# Patient Record
Sex: MALE
[De-identification: names, ages, dates, MRNs, and addresses within clinical notes are randomized per-mention and may not be internally consistent; named-entity substitution may affect disease eponyms.]

---

## 2022-06-03 ENCOUNTER — RX ONLY (RX ONLY)
Age: 42
End: 2022-06-03

## 2022-06-03 ENCOUNTER — SPOT (OUTPATIENT)
Dept: URBAN - METROPOLITAN AREA CLINIC 38 | Facility: CLINIC | Age: 42
Setting detail: DERMATOLOGY
End: 2022-06-03

## 2022-06-03 DIAGNOSIS — L57.0 ACTINIC KERATOSIS: ICD-10-CM

## 2022-06-03 PROBLEM — D23.5 OTHER BENIGN NEOPLASM OF SKIN OF TRUNK: Status: RESOLVED | Noted: 2022-06-03

## 2022-06-03 PROBLEM — L82.0 INFLAMED SEBORRHEIC KERATOSIS: Status: RESOLVED | Noted: 2022-06-03

## 2022-06-03 PROBLEM — L21.8 OTHER SEBORRHEIC DERMATITIS: Status: RESOLVED | Noted: 2022-06-03

## 2022-06-03 PROBLEM — L82.1 OTHER SEBORRHEIC KERATOSIS: Status: RESOLVED | Noted: 2022-06-03

## 2022-06-03 PROCEDURE — 99204 OFFICE O/P NEW MOD 45 MIN: CPT

## 2022-06-03 RX ORDER — KETOCONAZOLE 20 MG/G
A SMALL AMOUNT CREAM TOPICAL TWICE A DAY
Qty: 30 | Refills: 1
Start: 2022-06-03

## 2022-06-03 RX ORDER — HYDROCORTISONE 25 MG/G
A SMALL AMOUNT CREAM TOPICAL TWICE A DAY
Qty: 28 | Refills: 1
Start: 2022-06-03

## 2023-11-08 PROBLEM — I83.90 VARICOSE VEIN OF LEG: Status: ACTIVE | Noted: 2023-11-08

## 2023-11-08 PROBLEM — M91.10: Status: ACTIVE | Noted: 2023-11-08

## 2023-11-08 RX ORDER — MELOXICAM 15 MG/1
1 TABLET ORAL DAILY PRN
COMMUNITY
Start: 2022-02-23 | End: 2023-12-22 | Stop reason: ALTCHOICE

## 2023-11-10 ENCOUNTER — OFFICE VISIT (OUTPATIENT)
Dept: ORTHOPEDIC SURGERY | Facility: HOSPITAL | Age: 43
End: 2023-11-10
Payer: COMMERCIAL

## 2023-11-10 ENCOUNTER — HOSPITAL ENCOUNTER (OUTPATIENT)
Dept: RADIOLOGY | Facility: HOSPITAL | Age: 43
Discharge: HOME | End: 2023-11-10
Payer: COMMERCIAL

## 2023-11-10 VITALS — BODY MASS INDEX: 28.88 KG/M2 | WEIGHT: 195 LBS | HEIGHT: 69 IN

## 2023-11-10 DIAGNOSIS — M25.552 PAIN OF BOTH HIP JOINTS: ICD-10-CM

## 2023-11-10 DIAGNOSIS — M21.70 LEG LENGTH DISCREPANCY: ICD-10-CM

## 2023-11-10 DIAGNOSIS — M25.552 PAIN OF BOTH HIP JOINTS: Primary | ICD-10-CM

## 2023-11-10 DIAGNOSIS — M25.551 PAIN OF BOTH HIP JOINTS: Primary | ICD-10-CM

## 2023-11-10 DIAGNOSIS — M91.12: ICD-10-CM

## 2023-11-10 DIAGNOSIS — M25.551 PAIN OF BOTH HIP JOINTS: ICD-10-CM

## 2023-11-10 DIAGNOSIS — M16.12 ARTHRITIS OF LEFT HIP: ICD-10-CM

## 2023-11-10 PROCEDURE — 73502 X-RAY EXAM HIP UNI 2-3 VIEWS: CPT | Mod: LT

## 2023-11-10 PROCEDURE — 99215 OFFICE O/P EST HI 40 MIN: CPT | Performed by: ORTHOPAEDIC SURGERY

## 2023-11-10 PROCEDURE — 1036F TOBACCO NON-USER: CPT | Performed by: ORTHOPAEDIC SURGERY

## 2023-11-10 PROCEDURE — 73502 X-RAY EXAM HIP UNI 2-3 VIEWS: CPT | Mod: LEFT SIDE | Performed by: RADIOLOGY

## 2023-11-10 PROCEDURE — 73502 X-RAY EXAM HIP UNI 2-3 VIEWS: CPT | Mod: LT,FY

## 2023-11-10 ASSESSMENT — PAIN - FUNCTIONAL ASSESSMENT: PAIN_FUNCTIONAL_ASSESSMENT: NO/DENIES PAIN

## 2023-11-10 NOTE — PROGRESS NOTES
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  I saw him about 18 months ago and he was working toward a time when he would consider arthroplasty.  He is ready now having progressive pain having challenges performing his activities of daily living.  He is an avid hiker and outdoorsman and can all and participate in these activities.  He notes mostly groin pain and it is affecting his ability to work.    The patient does not endorse fevers and chills. ~He/She~ does not endorse any change in her vision or hearing. ~He/She~ does not endorse chest pain, shortness of breath. ~He/She~ does not endorse any abdominal discomfort. ~He/She~ does not endorse any skin irritation or lesions. ~He/She~ does not endorse any new numbness and tingling or as otherwise stated in the history of present illness.    ~He/She~ is in no acute distress, alert and oriented x 3.    Mood and affect are appropriate.    Respirations are unlabored.    Distal limb is pink and well perfused.    Left lower extremity evaluation demonstrates intact skin.  Range of motion is limited in deep flexion and internal rotation has pain with both of these.  Sensation is intact to light touch in the tibial, sural, saphenous, superficial peroneal, and deep peroneal nerve distributions. Foot is warm and well-perfused.    Multiple views of his left hip with straight flattening of the femoral head with coxa breva deformity and elongated femoral head as well as a reciprocal acetabular deformity.  The    43-year-old male with significant Perthes disease sequela and arthritis.  Surgical nonsurgical options.  We will plan for surgery 1/4/2024.  I talked with the patient at length about risks, limitations, benefits and alternatives to total hip replacement today. Under my care or the care of previous providers, the patient has had a reasonable trial of nonoperative treatment for their hip problem including NSAIDS, tylenol  or other analgesics, activity  modification and activity restriction and use of assistive devices.  These  previous treatments have not provided the patient with durable relief of their symptoms.The patient is not an appropriate candidate for physical therapy at this time. Despite the above, patient has had pain and symptomatic functional impairment that either interferes with their sleep or ADLs and quality of life. I reviewed concerns about implant wear, loosening, breakage, infection and infection prophylaxis, DVT, PE, death and other medical and anesthetic complications of surgery. We talked about the potential for persistent pain following surgery since there are many possible causes for hip and leg pain. The patient was advised that hip replacement will only relieve pain that is coming from the hip. We talked about leg length discrepancy, neurovascular problems and dislocation after surgery. The patient understands that we may have to lengthen the leg slightly to provide for adequate stability of the hip. I reviewed dislocation precautions and activity restrictions in detail. We discussed advantages and disadvantages of cemented and cementless implant fixation. We discussed the concerns about intraoperative fracture, ingrowth failure, thigh pain and possible post-operative weight bearing restrictions following cementless hip replacement. We discussed advantages and disadvantages of different surgical approaches. We discussed the possible need for a homologous blood transfusion. We discussed the fact that many of our patients are able to go home in 1-2 days depending on their health, mobility, pre-op preparation, individual home situation and personal preference. The patient should take our pre-operative teaching class. All of the patients questions were answered. The patient can call my office to schedule surgery and the pre-op teaching class. I told the patient that our pre-operative admission clinic will contact them to discuss fitness  for surgery. The patient has identified their personal goals of their joint replacement surgery and recovery and we have discussed them. These are documented in our [employer] patient engagement platform. In addition, we have discussed the advantages and disadvantages of various implant and fixation options, as well as various surgical approaches. The basic concepts of the joint replacement procedure has been reviewed with the patient and the patient will be provided the opportunity to see an actual implant in our pre-op education class.  Nonoperative and operative treatment options were presented to the patient. After discussion, operative treatment was elected. Risks and benefits of surgery were discussed with the patient which include, but are not limited to, death, infection, bleeding, neurologic damage, nonunion, malunion, posttraumatic arthritis, incomplete resolution of symptoms, failure of the operation, and others. The patient understood and elected to proceed.    Natural History reviewed. All questions answered. ~He/She~ was in agreement with the plan.      **This note was created using voice recognition software and was not corrected for typographical or grammatical errors.**

## 2023-11-13 DIAGNOSIS — M16.12 PRIMARY OSTEOARTHRITIS OF LEFT HIP: ICD-10-CM

## 2023-12-07 ENCOUNTER — TELEMEDICINE CLINICAL SUPPORT (OUTPATIENT)
Dept: PREADMISSION TESTING | Facility: HOSPITAL | Age: 43
End: 2023-12-07
Payer: COMMERCIAL

## 2023-12-22 ENCOUNTER — HOSPITAL ENCOUNTER (OUTPATIENT)
Dept: RADIOLOGY | Facility: HOSPITAL | Age: 43
Discharge: HOME | End: 2023-12-22
Payer: COMMERCIAL

## 2023-12-22 ENCOUNTER — PRE-ADMISSION TESTING (OUTPATIENT)
Dept: PREADMISSION TESTING | Facility: HOSPITAL | Age: 43
End: 2023-12-22
Payer: COMMERCIAL

## 2023-12-22 VITALS
TEMPERATURE: 98.2 F | OXYGEN SATURATION: 98 % | SYSTOLIC BLOOD PRESSURE: 136 MMHG | BODY MASS INDEX: 30.79 KG/M2 | HEIGHT: 69 IN | HEART RATE: 78 BPM | WEIGHT: 207.89 LBS | DIASTOLIC BLOOD PRESSURE: 99 MMHG

## 2023-12-22 DIAGNOSIS — Z01.818 PREOPERATIVE TESTING: Primary | ICD-10-CM

## 2023-12-22 LAB
ANION GAP SERPL CALC-SCNC: 17 MMOL/L (ref 10–20)
BUN SERPL-MCNC: 16 MG/DL (ref 6–23)
CALCIUM SERPL-MCNC: 9.7 MG/DL (ref 8.6–10.6)
CHLORIDE SERPL-SCNC: 105 MMOL/L (ref 98–107)
CO2 SERPL-SCNC: 22 MMOL/L (ref 21–32)
CREAT SERPL-MCNC: 0.94 MG/DL (ref 0.5–1.3)
ERYTHROCYTE [DISTWIDTH] IN BLOOD BY AUTOMATED COUNT: 11.9 % (ref 11.5–14.5)
GFR SERPL CREATININE-BSD FRML MDRD: >90 ML/MIN/1.73M*2
GLUCOSE SERPL-MCNC: 74 MG/DL (ref 74–99)
HCT VFR BLD AUTO: 46.3 % (ref 41–52)
HGB BLD-MCNC: 15.7 G/DL (ref 13.5–17.5)
MCH RBC QN AUTO: 31.2 PG (ref 26–34)
MCHC RBC AUTO-ENTMCNC: 33.9 G/DL (ref 32–36)
MCV RBC AUTO: 92 FL (ref 80–100)
NRBC BLD-RTO: 0 /100 WBCS (ref 0–0)
PLATELET # BLD AUTO: 224 X10*3/UL (ref 150–450)
POTASSIUM SERPL-SCNC: 4.3 MMOL/L (ref 3.5–5.3)
RBC # BLD AUTO: 5.03 X10*6/UL (ref 4.5–5.9)
SODIUM SERPL-SCNC: 140 MMOL/L (ref 136–145)
WBC # BLD AUTO: 6.8 X10*3/UL (ref 4.4–11.3)

## 2023-12-22 PROCEDURE — 72170 X-RAY EXAM OF PELVIS: CPT | Performed by: RADIOLOGY

## 2023-12-22 PROCEDURE — 85027 COMPLETE CBC AUTOMATED: CPT

## 2023-12-22 PROCEDURE — 72170 X-RAY EXAM OF PELVIS: CPT

## 2023-12-22 PROCEDURE — 80048 BASIC METABOLIC PNL TOTAL CA: CPT

## 2023-12-22 PROCEDURE — 36415 COLL VENOUS BLD VENIPUNCTURE: CPT

## 2023-12-22 PROCEDURE — 99203 OFFICE O/P NEW LOW 30 MIN: CPT | Performed by: NURSE PRACTITIONER

## 2023-12-22 PROCEDURE — 87081 CULTURE SCREEN ONLY: CPT

## 2023-12-22 RX ORDER — CHLORHEXIDINE GLUCONATE ORAL RINSE 1.2 MG/ML
SOLUTION DENTAL
Qty: 15 ML | Refills: 0 | Status: SHIPPED | OUTPATIENT
Start: 2023-12-22 | End: 2024-01-05 | Stop reason: HOSPADM

## 2023-12-22 RX ORDER — ACETAMINOPHEN 500 MG
500 TABLET ORAL EVERY 6 HOURS PRN
COMMUNITY
End: 2024-01-05 | Stop reason: HOSPADM

## 2023-12-22 RX ORDER — CHLORHEXIDINE GLUCONATE 40 MG/ML
SOLUTION TOPICAL
Qty: 473 ML | Refills: 0 | Status: SHIPPED | OUTPATIENT
Start: 2023-12-22 | End: 2024-01-05 | Stop reason: HOSPADM

## 2023-12-22 ASSESSMENT — ENCOUNTER SYMPTOMS
ARTHRALGIAS: 1
GASTROINTESTINAL NEGATIVE: 1
EYES NEGATIVE: 1
NECK NEGATIVE: 1
CARDIOVASCULAR NEGATIVE: 1
ENDOCRINE NEGATIVE: 1
CONSTITUTIONAL NEGATIVE: 1
RESPIRATORY NEGATIVE: 1
NEUROLOGICAL NEGATIVE: 1

## 2023-12-22 ASSESSMENT — CHADS2 SCORE
HYPERTENSION: NO
AGE GREATER THAN OR EQUAL TO 75: NO
PRIOR STROKE OR TIA OR THROMBOEMBOLISM: NO
DIABETES: NO
CHADS2 SCORE: 0
CHF: NO

## 2023-12-22 ASSESSMENT — DUKE ACTIVITY SCORE INDEX (DASI)
CAN YOU HAVE SEXUAL RELATIONS: YES
CAN YOU PARTICIPATE IN MODERATE RECREATIONAL ACTIVITIES LIKE GOLF, BOWLING, DANCING, DOUBLES TENNIS OR THROWING A BASEBALL OR FOOTBALL: YES
CAN YOU DO HEAVY WORK AROUND THE HOUSE LIKE SCRUBBING FLOORS OR LIFTING AND MOVING HEAVY FURNITURE: YES
CAN YOU DO LIGHT WORK AROUND THE HOUSE LIKE DUSTING OR WASHING DISHES: YES
CAN YOU DO YARD WORK LIKE RAKING LEAVES, WEEDING OR PUSHING A MOWER: YES
CAN YOU RUN A SHORT DISTANCE: YES
CAN YOU TAKE CARE OF YOURSELF (EAT, DRESS, BATHE, OR USE TOILET): YES
CAN YOU PARTICIPATE IN STRENOUS SPORTS LIKE SWIMMING, SINGLES TENNIS, FOOTBALL, BASKETBALL, OR SKIING: YES
CAN YOU CLIMB A FLIGHT OF STAIRS OR WALK UP A HILL: YES
TOTAL_SCORE: 58.2
DASI METS SCORE: 9.9
CAN YOU DO MODERATE WORK AROUND THE HOUSE LIKE VACUUMING, SWEEPING FLOORS OR CARRYING GROCERIES: YES
CAN YOU WALK INDOORS, SUCH AS AROUND YOUR HOUSE: YES
CAN YOU WALK A BLOCK OR TWO ON LEVEL GROUND: YES

## 2023-12-22 ASSESSMENT — LIFESTYLE VARIABLES: SMOKING_STATUS: NONSMOKER

## 2023-12-22 NOTE — CPM/PAT H&P
CPM/PAT Evaluation       Name: Angel Chadwick (Angel Chadwick)  /Age: 1980/43 y.o.     Visit Type:   In-Person       Chief Complaint: left hip pain    HPI    The patient is a 43 year old male with Perthes disease. He notes progressive hip pain and difficulty performing ADLs. He has failed conservative treatment and wishes to proceed with surgical intervention. He presents today for perioperative evaluation in anticipation of left arthroplasty total hip posterior approach on 24 wit Dr. Jefferson.    Past Medical History:   Diagnosis Date    Arthritis     Chronic pain disorder     Gastroenteritis     Joint pain     Dbnf-Xmxth-Ohgeqoy disease     Diagnosed at age 7    Varicose vein of leg        Past Surgical History:   Procedure Laterality Date    OTHER SURGICAL HISTORY  2022    No history of surgery    WISDOM TOOTH EXTRACTION      age 18       Patient Sexual activity questions deferred to the physician.    No family history on file.      No Known Allergies    Prior to Admission medications    Medication Sig Start Date End Date Taking? Authorizing Provider   meloxicam (Mobic) 15 mg tablet Take 1 tablet (15 mg) by mouth once daily as needed. 22   Historical Provider, MD MAST ROS:   Constitutional:   neg    Neuro/Psych:   neg    Eyes:   neg    Ears:   neg    Nose:   neg    Mouth:   neg    Throat:   neg    Neck:   neg    Cardio:   neg    Respiratory:   neg    Endocrine:   neg    GI:   neg    :   neg    Musculoskeletal:    Left hip pain   arthralgias  Hematologic:   neg    Skin:  neg        Physical Exam  Vitals reviewed.   Constitutional:       Appearance: Normal appearance.   HENT:      Head: Normocephalic and atraumatic.      Nose: Nose normal.      Mouth/Throat:      Mouth: Mucous membranes are moist.      Pharynx: Oropharynx is clear.   Eyes:      Extraocular Movements: Extraocular movements intact.      Pupils: Pupils are equal, round, and reactive to light.   Cardiovascular:      Rate  and Rhythm: Normal rate and regular rhythm.      Pulses: Normal pulses.      Heart sounds: Normal heart sounds.   Pulmonary:      Effort: Pulmonary effort is normal.      Breath sounds: Normal breath sounds.   Musculoskeletal:         General: Normal range of motion.      Cervical back: Normal range of motion.   Skin:     General: Skin is warm and dry.   Neurological:      General: No focal deficit present.      Mental Status: He is alert and oriented to person, place, and time.   Psychiatric:         Mood and Affect: Mood normal.         Behavior: Behavior normal.          PAT AIRWAY:   Airway:      Mustache, beard    Mallampati::  I    TM distance::  >3 FB    Neck ROM::  Full  normal        Visit Vitals  BP (!) 136/99   Pulse 78   Temp 36.8 °C (98.2 °F) (Oral)       DASI Risk Score      Flowsheet Row Most Recent Value   DASI SCORE 58.2   METS Score (Will be calculated only when all the questions are answered) 9.9          Caprini DVT Assessment      Flowsheet Row Most Recent Value   DVT Score 4   Current Status Major surgery planned, including arthroscopic and laproscopic (1-2 hours)   Age 40-59 years   BMI 30 or less          Modified Frailty Index      Flowsheet Row Most Recent Value   Modified Frailty Index Calculator 0          CHADS2 Stroke Risk         N/A 3 - 100%: High Risk   2 - 3%: Medium Risk   0 - 2%: Low Risk     Last Change: N/A          This score determines the patient's risk of having a stroke if the patient has atrial fibrillation.        This score is not applicable to this patient. Components are not calculated.          Revised Cardiac Risk Index      Flowsheet Row Most Recent Value   Revised Cardiac Risk Calculator 0          Apfel Simplified Score      Flowsheet Row Most Recent Value   Apfel Simplified Score Calculator 2          Risk Analysis Index Results This Encounter    No data found in the last 1 encounters.       Stop Bang Score      Flowsheet Row Most Recent Value   Do you snore  loudly? 0   Do you often feel tired or fatigued after your sleep? 0   Has anyone ever observed you stop breathing in your sleep? 0   Do you have or are you being treated for high blood pressure? 0   Recent BMI (Calculated) 28.8   Is BMI greater than 35 kg/m2? 0=No   Age older than 50 years old? 0=No   Is your neck circumference greater than 17 inches (Male) or 16 inches (Female)? 1   Gender - Male 1=Yes   STOP-BANG Total Score 2          Recent Results (from the past 336 hour(s))   Basic Metabolic Panel    Collection Time: 12/22/23 10:52 AM   Result Value Ref Range    Glucose 74 74 - 99 mg/dL    Sodium 140 136 - 145 mmol/L    Potassium 4.3 3.5 - 5.3 mmol/L    Chloride 105 98 - 107 mmol/L    Bicarbonate 22 21 - 32 mmol/L    Anion Gap 17 10 - 20 mmol/L    Urea Nitrogen 16 6 - 23 mg/dL    Creatinine 0.94 0.50 - 1.30 mg/dL    eGFR >90 >60 mL/min/1.73m*2    Calcium 9.7 8.6 - 10.6 mg/dL   CBC    Collection Time: 12/22/23 10:52 AM   Result Value Ref Range    WBC 6.8 4.4 - 11.3 x10*3/uL    nRBC 0.0 0.0 - 0.0 /100 WBCs    RBC 5.03 4.50 - 5.90 x10*6/uL    Hemoglobin 15.7 13.5 - 17.5 g/dL    Hematocrit 46.3 41.0 - 52.0 %    MCV 92 80 - 100 fL    MCH 31.2 26.0 - 34.0 pg    MCHC 33.9 32.0 - 36.0 g/dL    RDW 11.9 11.5 - 14.5 %    Platelets 224 150 - 450 x10*3/uL   Staphylococcus aureus/MRSA colonization, Culture    Collection Time: 12/22/23 10:52 AM    Specimen: Anterior Nares; Swab   Result Value Ref Range    Staph/MRSA Screen Culture No Staphylococcus aureus isolated         Diagnostic Results               Assessment and Plan:     Anesthesia:  The patient denies problems with anesthesia in the past such as PONV, prolonged sedation, awareness, dental damage, aspiration, cardiac arrest, difficult intubation, or unexpected hospital admissions.     Neuro:   The patient has no neurological diagnoses or significant findings on chart review, clinical presentation, and evaluation.  No grossly apparent perioperative risk. Handouts for  preoperative brain exercises given to patient.    HEENT/Airway  No diagnoses, significant findings on chart review, clinical presentation, or evaluation., No documented or reported history of airway difficulty.     Cardiovascular  The patient is scheduled for non-cardiac surgery associated with elevated risk.  The patient has no major cardiac contraindications to non- cardiac surgery.  RCRI  The patient meets 0-1 RCRI criteria and therefore has a less than 1% risk of major adverse cardiac complications.  METS  The patient's functional capacity capacity is greater than 4 METS.  EKG  The patient has no EKG or echocardiographic changes concerning for myocardial ischemia.  No further cardiac evaluation is indicated  Heart Failure  The patient has no known history of heart failure.  Additionally, the patient reports no symptoms of heart failure and demonstrates no signs of heart failure.  Hypertension Evaluation  The patient has no known history of hypertension and has a normal blood pressure today.  Heart Rhythm Evaluation  The patient has no history of arrhythmias.  Heart Valve Evaluation  The patient has no known history of valvular heart disease. The patient has no symptoms or physical exam findings to suggest valvular heart disease.  CARDS EVAL  The patient is not followed by cardiology.  The patient has a 30-day risk for MACE of 0 predictors, 3.9% risk for cardiac death, nonfatal myocardial infarction, and nonfatal cardiac arrest.  SHAGGY score which indicates a 0% risk of intraoperative or 30-day postoperative.    Pulmonary   No significant findings on chart review or clinical presentation and evaluation.  The patient has a stop bang score of 2, which places patient at low risk for having PRASHANT.  ARISCAT 16, low, 1.6% risk of in-hospital postoperative pulmonary complications  PRODIGY 11, intermediate of respiratory depression episode.    Hematology  No diagnoses or significant findings on chart review or clinical  presentation and evaluation.  Antiplatelet management   The patient is not currently receiving antiplatelet therapy.  Anticoagulation management  The patient is not currently receiving anticoagulation therapy. Patient provided with DVT educational handout.    Caprini score 4, high risk of perioperative VTE    GI  No diagnoses or significant findings on chart review or clinical presentation and evaluation.  Eat 10- 0,  self-perceived oropharyngeal dysphagia scale (0-40)     Genitourinary  No diagnoses or significant findings on chart review or clinical presentation and evaluation.    Renal  The patient has no known history of chronic kidney disease. No renal diagnoses or significant findings on chart review or clinical presentation and evaluation. The patient has specific risk factors associated with increased risk of perioperative renal complications due to male gender. Preventative measures include preoperative hydration.    Musculoskeletal  The patient has diagnoses or significant findings on chart review or clinical presentation and evaluation significant for left hip pain, perthes disease . Scheduled for left hip arthroplasty on 1/4/24 with Dr. Jefferson.    Endocrine  Diabetes Evaluation  The patient has no history of diabetes mellitus  Thyroid Disease Evaluation  The patient has no history of thyroid disease.    ID  No diagnoses or significant findings on chart review or clinical presentation and evaluation.    -Preoperative medication instructions were provided and reviewed with the patient.  Any additional testing or evaluation was explained to the patient.  NPO Instructions were discussed, and the patient's questions were answered prior to conclusion of this encounter

## 2023-12-22 NOTE — PREPROCEDURE INSTRUCTIONS
NPO Instructions:    Do not eat any food after midnight the night before your surgery/procedure.  You may have up to TEN ounces of clear liquids until TWO hours before your instructed arrival time to the hospital. This includes water, black tea/coffee, (no milk or cream), apple juice, and/or electrolyte drinks (Gatorade).  You may chew gum up to TWO hours before your surgery/procedure.    Additional Instructions:     We have sent a prescription for Hibiclens soap and Peridex mouth wash to your preferred pharmacy.  If you have not already, Please  your prescription and start using as directed before surgery.  Follow the instruction sheet provided to you at your CPM/PAT appointment.    Avoid herbal supplements, multivitamins and NSAIDS (non-steroidal anti-inflammatory drugs) such as Advil, Aleve, Ibuprofen, Naproxen, Excedrin, Meloxicam or Celebrex for at least 7 days prior to surgery. May take Tylenol as needed.    Seven/Six Days before Surgery:  Review your medication instructions, stop indicated medications    Day of Surgery:  Review your medication instructions, take indicated medications  Wear comfortable loose fitting clothing  Do not use moisturizers, creams, lotions or perfume  All jewelry and valuables should be left at home    [facility]  [facility]  Crugw-[phone]  Agh-[phone]  [email or web address]

## 2023-12-23 LAB — STAPHYLOCOCCUS SPEC CULT: NORMAL

## 2024-01-03 ENCOUNTER — ANESTHESIA EVENT (OUTPATIENT)
Dept: OPERATING ROOM | Facility: HOSPITAL | Age: 44
End: 2024-01-03
Payer: COMMERCIAL

## 2024-01-04 ENCOUNTER — ANESTHESIA (OUTPATIENT)
Dept: OPERATING ROOM | Facility: HOSPITAL | Age: 44
End: 2024-01-04
Payer: COMMERCIAL

## 2024-01-04 ENCOUNTER — APPOINTMENT (OUTPATIENT)
Dept: RADIOLOGY | Facility: HOSPITAL | Age: 44
End: 2024-01-04
Payer: COMMERCIAL

## 2024-01-04 ENCOUNTER — HOME HEALTH ADMISSION (OUTPATIENT)
Dept: HOME HEALTH SERVICES | Facility: HOME HEALTH | Age: 44
End: 2024-01-04
Payer: COMMERCIAL

## 2024-01-04 ENCOUNTER — DOCUMENTATION (OUTPATIENT)
Dept: HOME HEALTH SERVICES | Facility: HOME HEALTH | Age: 44
End: 2024-01-04

## 2024-01-04 ENCOUNTER — HOSPITAL ENCOUNTER (OUTPATIENT)
Facility: HOSPITAL | Age: 44
Discharge: HOME | End: 2024-01-05
Attending: ORTHOPAEDIC SURGERY | Admitting: ORTHOPAEDIC SURGERY
Payer: COMMERCIAL

## 2024-01-04 DIAGNOSIS — M16.12 PRIMARY OSTEOARTHRITIS OF LEFT HIP: ICD-10-CM

## 2024-01-04 DIAGNOSIS — M16.12 OSTEOARTHRITIS OF LEFT HIP, UNSPECIFIED OSTEOARTHRITIS TYPE: Primary | ICD-10-CM

## 2024-01-04 PROBLEM — M16.9 HIP OSTEOARTHRITIS: Status: ACTIVE | Noted: 2024-01-04

## 2024-01-04 PROCEDURE — 99223 1ST HOSP IP/OBS HIGH 75: CPT | Performed by: STUDENT IN AN ORGANIZED HEALTH CARE EDUCATION/TRAINING PROGRAM

## 2024-01-04 PROCEDURE — 97161 PT EVAL LOW COMPLEX 20 MIN: CPT | Mod: GP

## 2024-01-04 PROCEDURE — 3600000017 HC OR TIME - EACH INCREMENTAL 1 MINUTE - PROCEDURE LEVEL SIX: Performed by: ORTHOPAEDIC SURGERY

## 2024-01-04 PROCEDURE — A4217 STERILE WATER/SALINE, 500 ML: HCPCS | Performed by: ORTHOPAEDIC SURGERY

## 2024-01-04 PROCEDURE — 2500000004 HC RX 250 GENERAL PHARMACY W/ HCPCS (ALT 636 FOR OP/ED)

## 2024-01-04 PROCEDURE — 72170 X-RAY EXAM OF PELVIS: CPT

## 2024-01-04 PROCEDURE — 72170 X-RAY EXAM OF PELVIS: CPT | Mod: 76

## 2024-01-04 PROCEDURE — C1713 ANCHOR/SCREW BN/BN,TIS/BN: HCPCS | Performed by: ORTHOPAEDIC SURGERY

## 2024-01-04 PROCEDURE — 2500000004 HC RX 250 GENERAL PHARMACY W/ HCPCS (ALT 636 FOR OP/ED): Performed by: PHYSICIAN ASSISTANT

## 2024-01-04 PROCEDURE — 97110 THERAPEUTIC EXERCISES: CPT | Mod: GP

## 2024-01-04 PROCEDURE — 72170 X-RAY EXAM OF PELVIS: CPT | Mod: FOREIGN READ | Performed by: RADIOLOGY

## 2024-01-04 PROCEDURE — 2500000004 HC RX 250 GENERAL PHARMACY W/ HCPCS (ALT 636 FOR OP/ED): Performed by: ORTHOPAEDIC SURGERY

## 2024-01-04 PROCEDURE — 2500000001 HC RX 250 WO HCPCS SELF ADMINISTERED DRUGS (ALT 637 FOR MEDICARE OP): Performed by: PHYSICIAN ASSISTANT

## 2024-01-04 PROCEDURE — 2500000005 HC RX 250 GENERAL PHARMACY W/O HCPCS: Performed by: ORTHOPAEDIC SURGERY

## 2024-01-04 PROCEDURE — 27130 TOTAL HIP ARTHROPLASTY: CPT | Performed by: PHYSICIAN ASSISTANT

## 2024-01-04 PROCEDURE — C1776 JOINT DEVICE (IMPLANTABLE): HCPCS | Performed by: ORTHOPAEDIC SURGERY

## 2024-01-04 PROCEDURE — 3700000001 HC GENERAL ANESTHESIA TIME - INITIAL BASE CHARGE: Performed by: ORTHOPAEDIC SURGERY

## 2024-01-04 PROCEDURE — 3700000002 HC GENERAL ANESTHESIA TIME - EACH INCREMENTAL 1 MINUTE: Performed by: ORTHOPAEDIC SURGERY

## 2024-01-04 PROCEDURE — 72170 X-RAY EXAM OF PELVIS: CPT | Performed by: RADIOLOGY

## 2024-01-04 PROCEDURE — 2720000007 HC OR 272 NO HCPCS: Performed by: ORTHOPAEDIC SURGERY

## 2024-01-04 PROCEDURE — 7100000002 HC RECOVERY ROOM TIME - EACH INCREMENTAL 1 MINUTE: Performed by: ORTHOPAEDIC SURGERY

## 2024-01-04 PROCEDURE — 2500000002 HC RX 250 W HCPCS SELF ADMINISTERED DRUGS (ALT 637 FOR MEDICARE OP, ALT 636 FOR OP/ED)

## 2024-01-04 PROCEDURE — 27130 TOTAL HIP ARTHROPLASTY: CPT | Performed by: ORTHOPAEDIC SURGERY

## 2024-01-04 PROCEDURE — 3600000018 HC OR TIME - INITIAL BASE CHARGE - PROCEDURE LEVEL SIX: Performed by: ORTHOPAEDIC SURGERY

## 2024-01-04 PROCEDURE — 2780000003 HC OR 278 NO HCPCS: Performed by: ORTHOPAEDIC SURGERY

## 2024-01-04 PROCEDURE — G0378 HOSPITAL OBSERVATION PER HR: HCPCS

## 2024-01-04 PROCEDURE — 7100000001 HC RECOVERY ROOM TIME - INITIAL BASE CHARGE: Performed by: ORTHOPAEDIC SURGERY

## 2024-01-04 PROCEDURE — A27130 PR TOTAL HIP ARTHROPLASTY: Performed by: ANESTHESIOLOGY

## 2024-01-04 PROCEDURE — 2500000005 HC RX 250 GENERAL PHARMACY W/O HCPCS

## 2024-01-04 PROCEDURE — 72170 X-RAY EXAM OF PELVIS: CPT | Mod: 76,FR

## 2024-01-04 PROCEDURE — 76942 ECHO GUIDE FOR BIOPSY: CPT | Performed by: ANESTHESIOLOGY

## 2024-01-04 DEVICE — SCREW CANCELLOUS 6.5 X 20: Type: IMPLANTABLE DEVICE | Site: HIP | Status: FUNCTIONAL

## 2024-01-04 DEVICE — LINER, ALTRX, NEURTAL, 36 X 56MM: Type: IMPLANTABLE DEVICE | Site: HIP | Status: FUNCTIONAL

## 2024-01-04 DEVICE — SCREW, CANCELLOUS 6.5 X 35: Type: IMPLANTABLE DEVICE | Site: HIP | Status: FUNCTIONAL

## 2024-01-04 DEVICE — FEMORAL HEAD, CERAMIC 36 +5: Type: IMPLANTABLE DEVICE | Site: HIP | Status: FUNCTIONAL

## 2024-01-04 DEVICE — ACETABULAR CUP, SECTOR, GRIPTON, SIZE 56MM: Type: IMPLANTABLE DEVICE | Site: HIP | Status: FUNCTIONAL

## 2024-01-04 DEVICE — IMPLANTABLE DEVICE: Type: IMPLANTABLE DEVICE | Site: HIP | Status: FUNCTIONAL

## 2024-01-04 RX ORDER — OXYCODONE HYDROCHLORIDE 5 MG/1
5 TABLET ORAL EVERY 4 HOURS PRN
Status: DISCONTINUED | OUTPATIENT
Start: 2024-01-04 | End: 2024-01-04 | Stop reason: HOSPADM

## 2024-01-04 RX ORDER — DOCUSATE SODIUM 100 MG/1
100 CAPSULE, LIQUID FILLED ORAL 2 TIMES DAILY
Qty: 14 CAPSULE | Refills: 0 | Status: SHIPPED | OUTPATIENT
Start: 2024-01-04 | End: 2024-01-25 | Stop reason: WASHOUT

## 2024-01-04 RX ORDER — SODIUM CHLORIDE, SODIUM LACTATE, POTASSIUM CHLORIDE, CALCIUM CHLORIDE 600; 310; 30; 20 MG/100ML; MG/100ML; MG/100ML; MG/100ML
100 INJECTION, SOLUTION INTRAVENOUS CONTINUOUS
Status: DISCONTINUED | OUTPATIENT
Start: 2024-01-04 | End: 2024-01-04 | Stop reason: HOSPADM

## 2024-01-04 RX ORDER — LIDOCAINE HYDROCHLORIDE 10 MG/ML
0.1 INJECTION INFILTRATION; PERINEURAL ONCE
Status: DISCONTINUED | OUTPATIENT
Start: 2024-01-04 | End: 2024-01-04 | Stop reason: HOSPADM

## 2024-01-04 RX ORDER — HYDROMORPHONE HYDROCHLORIDE 1 MG/ML
0.4 INJECTION, SOLUTION INTRAMUSCULAR; INTRAVENOUS; SUBCUTANEOUS EVERY 5 MIN PRN
Status: DISCONTINUED | OUTPATIENT
Start: 2024-01-04 | End: 2024-01-04 | Stop reason: HOSPADM

## 2024-01-04 RX ORDER — ONDANSETRON HYDROCHLORIDE 2 MG/ML
INJECTION, SOLUTION INTRAVENOUS AS NEEDED
Status: DISCONTINUED | OUTPATIENT
Start: 2024-01-04 | End: 2024-01-04

## 2024-01-04 RX ORDER — LIDOCAINE HCL/PF 100 MG/5ML
SYRINGE (ML) INTRAVENOUS AS NEEDED
Status: DISCONTINUED | OUTPATIENT
Start: 2024-01-04 | End: 2024-01-04

## 2024-01-04 RX ORDER — NALOXONE HYDROCHLORIDE 0.4 MG/ML
0.2 INJECTION, SOLUTION INTRAMUSCULAR; INTRAVENOUS; SUBCUTANEOUS EVERY 5 MIN PRN
Status: DISCONTINUED | OUTPATIENT
Start: 2024-01-04 | End: 2024-01-05 | Stop reason: HOSPADM

## 2024-01-04 RX ORDER — MELOXICAM 15 MG/1
15 TABLET ORAL DAILY
Qty: 30 TABLET | Refills: 0 | Status: SHIPPED | OUTPATIENT
Start: 2024-01-04 | End: 2024-02-03

## 2024-01-04 RX ORDER — KETOROLAC TROMETHAMINE 30 MG/ML
30 INJECTION, SOLUTION INTRAMUSCULAR; INTRAVENOUS EVERY 6 HOURS
Status: COMPLETED | OUTPATIENT
Start: 2024-01-04 | End: 2024-01-05

## 2024-01-04 RX ORDER — ESMOLOL HYDROCHLORIDE 10 MG/ML
INJECTION INTRAVENOUS AS NEEDED
Status: DISCONTINUED | OUTPATIENT
Start: 2024-01-04 | End: 2024-01-04

## 2024-01-04 RX ORDER — OXYCODONE HYDROCHLORIDE 5 MG/1
5 TABLET ORAL EVERY 6 HOURS
Qty: 28 TABLET | Refills: 0 | Status: SHIPPED | OUTPATIENT
Start: 2024-01-04 | End: 2024-01-25 | Stop reason: WASHOUT

## 2024-01-04 RX ORDER — HYDROMORPHONE HYDROCHLORIDE 1 MG/ML
0.2 INJECTION, SOLUTION INTRAMUSCULAR; INTRAVENOUS; SUBCUTANEOUS EVERY 5 MIN PRN
Status: DISCONTINUED | OUTPATIENT
Start: 2024-01-04 | End: 2024-01-04 | Stop reason: HOSPADM

## 2024-01-04 RX ORDER — ROCURONIUM BROMIDE 10 MG/ML
INJECTION, SOLUTION INTRAVENOUS AS NEEDED
Status: DISCONTINUED | OUTPATIENT
Start: 2024-01-04 | End: 2024-01-04

## 2024-01-04 RX ORDER — DEXMEDETOMIDINE HYDROCHLORIDE 4 UG/ML
INJECTION, SOLUTION INTRAVENOUS CONTINUOUS PRN
Status: DISCONTINUED | OUTPATIENT
Start: 2024-01-04 | End: 2024-01-04

## 2024-01-04 RX ORDER — CEFAZOLIN SODIUM 2 G/100ML
2 INJECTION, SOLUTION INTRAVENOUS EVERY 8 HOURS
Status: COMPLETED | OUTPATIENT
Start: 2024-01-04 | End: 2024-01-05

## 2024-01-04 RX ORDER — TRAMADOL HYDROCHLORIDE 50 MG/1
50 TABLET ORAL EVERY 6 HOURS PRN
Status: DISCONTINUED | OUTPATIENT
Start: 2024-01-04 | End: 2024-01-05 | Stop reason: HOSPADM

## 2024-01-04 RX ORDER — SODIUM CHLORIDE 9 MG/ML
100 INJECTION, SOLUTION INTRAVENOUS CONTINUOUS
Status: DISCONTINUED | OUTPATIENT
Start: 2024-01-04 | End: 2024-01-05 | Stop reason: HOSPADM

## 2024-01-04 RX ORDER — ONDANSETRON HYDROCHLORIDE 2 MG/ML
4 INJECTION, SOLUTION INTRAVENOUS EVERY 8 HOURS PRN
Status: DISCONTINUED | OUTPATIENT
Start: 2024-01-04 | End: 2024-01-05 | Stop reason: HOSPADM

## 2024-01-04 RX ORDER — SODIUM CHLORIDE 0.9 G/100ML
IRRIGANT IRRIGATION AS NEEDED
Status: DISCONTINUED | OUTPATIENT
Start: 2024-01-04 | End: 2024-01-04 | Stop reason: HOSPADM

## 2024-01-04 RX ORDER — ACETAMINOPHEN 325 MG/1
TABLET ORAL AS NEEDED
Status: DISCONTINUED | OUTPATIENT
Start: 2024-01-04 | End: 2024-01-04

## 2024-01-04 RX ORDER — OXYCODONE HYDROCHLORIDE 5 MG/1
5 TABLET ORAL EVERY 6 HOURS PRN
Status: DISCONTINUED | OUTPATIENT
Start: 2024-01-04 | End: 2024-01-05 | Stop reason: HOSPADM

## 2024-01-04 RX ORDER — BUPIVACAINE HYDROCHLORIDE 2.5 MG/ML
INJECTION, SOLUTION INFILTRATION; PERINEURAL AS NEEDED
Status: DISCONTINUED | OUTPATIENT
Start: 2024-01-04 | End: 2024-01-04 | Stop reason: HOSPADM

## 2024-01-04 RX ORDER — ACETAMINOPHEN 325 MG/1
650 TABLET ORAL EVERY 6 HOURS
Qty: 60 TABLET | Refills: 0 | Status: SHIPPED | OUTPATIENT
Start: 2024-01-04 | End: 2024-02-03

## 2024-01-04 RX ORDER — TOBRAMYCIN 1.2 G/30ML
INJECTION, POWDER, LYOPHILIZED, FOR SOLUTION INTRAVENOUS AS NEEDED
Status: DISCONTINUED | OUTPATIENT
Start: 2024-01-04 | End: 2024-01-04 | Stop reason: HOSPADM

## 2024-01-04 RX ORDER — POLYETHYLENE GLYCOL 3350 17 G/17G
17 POWDER, FOR SOLUTION ORAL DAILY
Qty: 10 PACKET | Refills: 0 | Status: SHIPPED | OUTPATIENT
Start: 2024-01-04 | End: 2024-01-25 | Stop reason: WASHOUT

## 2024-01-04 RX ORDER — METHOCARBAMOL 100 MG/ML
1000 INJECTION, SOLUTION INTRAMUSCULAR; INTRAVENOUS ONCE
Status: DISCONTINUED | OUTPATIENT
Start: 2024-01-04 | End: 2024-01-04 | Stop reason: HOSPADM

## 2024-01-04 RX ORDER — MIDAZOLAM HYDROCHLORIDE 1 MG/ML
INJECTION INTRAMUSCULAR; INTRAVENOUS AS NEEDED
Status: DISCONTINUED | OUTPATIENT
Start: 2024-01-04 | End: 2024-01-04

## 2024-01-04 RX ORDER — APREPITANT 40 MG/1
CAPSULE ORAL AS NEEDED
Status: DISCONTINUED | OUTPATIENT
Start: 2024-01-04 | End: 2024-01-04

## 2024-01-04 RX ORDER — PANTOPRAZOLE SODIUM 40 MG/1
40 TABLET, DELAYED RELEASE ORAL
Qty: 30 TABLET | Refills: 0 | Status: SHIPPED | OUTPATIENT
Start: 2024-01-04 | End: 2024-02-03

## 2024-01-04 RX ORDER — KETOROLAC TROMETHAMINE 30 MG/ML
INJECTION, SOLUTION INTRAMUSCULAR; INTRAVENOUS AS NEEDED
Status: DISCONTINUED | OUTPATIENT
Start: 2024-01-04 | End: 2024-01-04 | Stop reason: HOSPADM

## 2024-01-04 RX ORDER — HYDROMORPHONE HYDROCHLORIDE 1 MG/ML
0.5 INJECTION, SOLUTION INTRAMUSCULAR; INTRAVENOUS; SUBCUTANEOUS EVERY 2 HOUR PRN
Status: DISCONTINUED | OUTPATIENT
Start: 2024-01-04 | End: 2024-01-05 | Stop reason: HOSPADM

## 2024-01-04 RX ORDER — SODIUM CHLORIDE 9 MG/ML
INJECTION INTRAMUSCULAR; INTRAVENOUS; SUBCUTANEOUS AS NEEDED
Status: DISCONTINUED | OUTPATIENT
Start: 2024-01-04 | End: 2024-01-04 | Stop reason: HOSPADM

## 2024-01-04 RX ORDER — ASPIRIN 81 MG/1
81 TABLET ORAL 2 TIMES DAILY
Qty: 60 TABLET | Refills: 0 | Status: SHIPPED | OUTPATIENT
Start: 2024-01-04 | End: 2024-02-03

## 2024-01-04 RX ORDER — CYCLOBENZAPRINE HCL 10 MG
10 TABLET ORAL 3 TIMES DAILY PRN
Qty: 30 TABLET | Refills: 0 | Status: SHIPPED | OUTPATIENT
Start: 2024-01-04 | End: 2024-01-25 | Stop reason: WASHOUT

## 2024-01-04 RX ORDER — HYDROMORPHONE HYDROCHLORIDE 1 MG/ML
INJECTION, SOLUTION INTRAMUSCULAR; INTRAVENOUS; SUBCUTANEOUS AS NEEDED
Status: DISCONTINUED | OUTPATIENT
Start: 2024-01-04 | End: 2024-01-04

## 2024-01-04 RX ORDER — NEOSTIGMINE METHYLSULFATE 1 MG/ML
INJECTION, SOLUTION INTRAVENOUS AS NEEDED
Status: DISCONTINUED | OUTPATIENT
Start: 2024-01-04 | End: 2024-01-04

## 2024-01-04 RX ORDER — POLYETHYLENE GLYCOL 3350 17 G/17G
17 POWDER, FOR SOLUTION ORAL DAILY
Status: DISCONTINUED | OUTPATIENT
Start: 2024-01-04 | End: 2024-01-05 | Stop reason: HOSPADM

## 2024-01-04 RX ORDER — CYCLOBENZAPRINE HCL 10 MG
10 TABLET ORAL 3 TIMES DAILY PRN
Status: DISCONTINUED | OUTPATIENT
Start: 2024-01-04 | End: 2024-01-05 | Stop reason: HOSPADM

## 2024-01-04 RX ORDER — TRAMADOL HYDROCHLORIDE 50 MG/1
50 TABLET ORAL EVERY 6 HOURS PRN
Qty: 28 TABLET | Refills: 0 | Status: SHIPPED | OUTPATIENT
Start: 2024-01-04 | End: 2024-03-08 | Stop reason: SDUPTHER

## 2024-01-04 RX ORDER — OXYCODONE HYDROCHLORIDE 5 MG/1
10 TABLET ORAL EVERY 4 HOURS PRN
Status: DISCONTINUED | OUTPATIENT
Start: 2024-01-04 | End: 2024-01-04 | Stop reason: HOSPADM

## 2024-01-04 RX ORDER — PANTOPRAZOLE SODIUM 40 MG/1
40 TABLET, DELAYED RELEASE ORAL
Status: DISCONTINUED | OUTPATIENT
Start: 2024-01-05 | End: 2024-01-05 | Stop reason: HOSPADM

## 2024-01-04 RX ORDER — DIPHENHYDRAMINE HCL 12.5MG/5ML
12.5 LIQUID (ML) ORAL EVERY 6 HOURS PRN
Status: DISCONTINUED | OUTPATIENT
Start: 2024-01-04 | End: 2024-01-05 | Stop reason: HOSPADM

## 2024-01-04 RX ORDER — ASPIRIN 81 MG/1
81 TABLET ORAL 2 TIMES DAILY
Status: DISCONTINUED | OUTPATIENT
Start: 2024-01-04 | End: 2024-01-05 | Stop reason: HOSPADM

## 2024-01-04 RX ORDER — FENTANYL CITRATE 50 UG/ML
INJECTION, SOLUTION INTRAMUSCULAR; INTRAVENOUS AS NEEDED
Status: DISCONTINUED | OUTPATIENT
Start: 2024-01-04 | End: 2024-01-04

## 2024-01-04 RX ORDER — ONDANSETRON 4 MG/1
4 TABLET, FILM COATED ORAL EVERY 8 HOURS PRN
Status: DISCONTINUED | OUTPATIENT
Start: 2024-01-04 | End: 2024-01-05 | Stop reason: HOSPADM

## 2024-01-04 RX ORDER — TRANEXAMIC ACID 10 MG/ML
INJECTION, SOLUTION INTRAVENOUS AS NEEDED
Status: DISCONTINUED | OUTPATIENT
Start: 2024-01-04 | End: 2024-01-04

## 2024-01-04 RX ORDER — PROPOFOL 10 MG/ML
INJECTION, EMULSION INTRAVENOUS AS NEEDED
Status: DISCONTINUED | OUTPATIENT
Start: 2024-01-04 | End: 2024-01-04

## 2024-01-04 RX ORDER — VANCOMYCIN HYDROCHLORIDE 1 G/20ML
INJECTION, POWDER, LYOPHILIZED, FOR SOLUTION INTRAVENOUS AS NEEDED
Status: DISCONTINUED | OUTPATIENT
Start: 2024-01-04 | End: 2024-01-04 | Stop reason: HOSPADM

## 2024-01-04 RX ORDER — CEFAZOLIN 1 G/1
INJECTION, POWDER, FOR SOLUTION INTRAVENOUS AS NEEDED
Status: DISCONTINUED | OUTPATIENT
Start: 2024-01-04 | End: 2024-01-04

## 2024-01-04 RX ORDER — OXYCODONE HYDROCHLORIDE 5 MG/1
2.5 TABLET ORAL EVERY 4 HOURS PRN
Status: DISCONTINUED | OUTPATIENT
Start: 2024-01-04 | End: 2024-01-05 | Stop reason: HOSPADM

## 2024-01-04 RX ORDER — METHOCARBAMOL 100 MG/ML
1000 INJECTION, SOLUTION INTRAMUSCULAR; INTRAVENOUS ONCE
Status: COMPLETED | OUTPATIENT
Start: 2024-01-04 | End: 2024-01-04

## 2024-01-04 RX ORDER — ACETAMINOPHEN 325 MG/1
650 TABLET ORAL EVERY 6 HOURS SCHEDULED
Status: DISCONTINUED | OUTPATIENT
Start: 2024-01-04 | End: 2024-01-05 | Stop reason: HOSPADM

## 2024-01-04 RX ORDER — OXYCODONE HYDROCHLORIDE 5 MG/1
10 TABLET ORAL EVERY 4 HOURS PRN
Status: DISCONTINUED | OUTPATIENT
Start: 2024-01-04 | End: 2024-01-05 | Stop reason: HOSPADM

## 2024-01-04 RX ORDER — GLYCOPYRROLATE 0.2 MG/ML
INJECTION INTRAMUSCULAR; INTRAVENOUS AS NEEDED
Status: DISCONTINUED | OUTPATIENT
Start: 2024-01-04 | End: 2024-01-04

## 2024-01-04 RX ORDER — DEXAMETHASONE SODIUM PHOSPHATE 4 MG/ML
INJECTION, SOLUTION INTRA-ARTICULAR; INTRALESIONAL; INTRAMUSCULAR; INTRAVENOUS; SOFT TISSUE AS NEEDED
Status: DISCONTINUED | OUTPATIENT
Start: 2024-01-04 | End: 2024-01-04

## 2024-01-04 RX ADMIN — KETOROLAC TROMETHAMINE 30 MG: 30 INJECTION, SOLUTION INTRAMUSCULAR; INTRAVENOUS at 22:50

## 2024-01-04 RX ADMIN — GLYCOPYRROLATE 1 MG: 0.2 INJECTION, SOLUTION INTRAMUSCULAR; INTRAVENOUS at 13:28

## 2024-01-04 RX ADMIN — PROPOFOL 200 MG: 10 INJECTION, EMULSION INTRAVENOUS at 11:06

## 2024-01-04 RX ADMIN — ROCURONIUM BROMIDE 10 MG: 10 INJECTION INTRAVENOUS at 12:49

## 2024-01-04 RX ADMIN — KETOROLAC TROMETHAMINE 30 MG: 30 INJECTION, SOLUTION INTRAMUSCULAR; INTRAVENOUS at 16:51

## 2024-01-04 RX ADMIN — TRANEXAMIC ACID 1000 MG: 10 INJECTION, SOLUTION INTRAVENOUS at 13:08

## 2024-01-04 RX ADMIN — HYDROMORPHONE HYDROCHLORIDE 0.2 MG: 1 INJECTION, SOLUTION INTRAMUSCULAR; INTRAVENOUS; SUBCUTANEOUS at 11:50

## 2024-01-04 RX ADMIN — OXYCODONE HYDROCHLORIDE 10 MG: 5 TABLET ORAL at 21:36

## 2024-01-04 RX ADMIN — ASPIRIN 81 MG: 81 TABLET, COATED ORAL at 21:36

## 2024-01-04 RX ADMIN — OXYCODONE HYDROCHLORIDE 10 MG: 5 TABLET ORAL at 16:50

## 2024-01-04 RX ADMIN — HYDROMORPHONE HYDROCHLORIDE 0.4 MG: 1 INJECTION, SOLUTION INTRAMUSCULAR; INTRAVENOUS; SUBCUTANEOUS at 11:39

## 2024-01-04 RX ADMIN — LIDOCAINE HYDROCHLORIDE 100 MG: 20 INJECTION INTRAVENOUS at 11:06

## 2024-01-04 RX ADMIN — PROPOFOL 50 MG: 10 INJECTION, EMULSION INTRAVENOUS at 11:10

## 2024-01-04 RX ADMIN — HYDROMORPHONE HYDROCHLORIDE 0.2 MG: 1 INJECTION, SOLUTION INTRAMUSCULAR; INTRAVENOUS; SUBCUTANEOUS at 15:13

## 2024-01-04 RX ADMIN — TRAMADOL HYDROCHLORIDE 50 MG: 50 TABLET, COATED ORAL at 19:45

## 2024-01-04 RX ADMIN — DEXAMETHASONE SODIUM PHOSPHATE 8 MG: 4 INJECTION INTRA-ARTICULAR; INTRALESIONAL; INTRAMUSCULAR; INTRAVENOUS; SOFT TISSUE at 11:20

## 2024-01-04 RX ADMIN — ROCURONIUM BROMIDE 10 MG: 10 INJECTION INTRAVENOUS at 12:07

## 2024-01-04 RX ADMIN — PROPOFOL 150 MG: 10 INJECTION, EMULSION INTRAVENOUS at 11:08

## 2024-01-04 RX ADMIN — ACETAMINOPHEN 975 MG: 325 TABLET ORAL at 10:18

## 2024-01-04 RX ADMIN — HYDROMORPHONE HYDROCHLORIDE 0.4 MG: 1 INJECTION, SOLUTION INTRAMUSCULAR; INTRAVENOUS; SUBCUTANEOUS at 13:52

## 2024-01-04 RX ADMIN — SODIUM CHLORIDE, SODIUM LACTATE, POTASSIUM CHLORIDE, AND CALCIUM CHLORIDE: 600; 310; 30; 20 INJECTION, SOLUTION INTRAVENOUS at 10:46

## 2024-01-04 RX ADMIN — ONDANSETRON 4 MG: 2 INJECTION INTRAMUSCULAR; INTRAVENOUS at 12:58

## 2024-01-04 RX ADMIN — HYDROMORPHONE HYDROCHLORIDE 0.2 MG: 1 INJECTION, SOLUTION INTRAMUSCULAR; INTRAVENOUS; SUBCUTANEOUS at 14:41

## 2024-01-04 RX ADMIN — HYDROMORPHONE HYDROCHLORIDE 0.4 MG: 1 INJECTION, SOLUTION INTRAMUSCULAR; INTRAVENOUS; SUBCUTANEOUS at 12:27

## 2024-01-04 RX ADMIN — FENTANYL CITRATE 100 MCG: 50 INJECTION, SOLUTION INTRAMUSCULAR; INTRAVENOUS at 11:06

## 2024-01-04 RX ADMIN — ROCURONIUM BROMIDE 50 MG: 10 INJECTION INTRAVENOUS at 11:08

## 2024-01-04 RX ADMIN — ROCURONIUM BROMIDE 10 MG: 10 INJECTION INTRAVENOUS at 11:47

## 2024-01-04 RX ADMIN — DEXMEDETOMIDINE HYDROCHLORIDE 0.5 MCG/KG/HR: 400 INJECTION INTRAVENOUS at 11:24

## 2024-01-04 RX ADMIN — TRANEXAMIC ACID 1000 MG: 10 INJECTION, SOLUTION INTRAVENOUS at 11:20

## 2024-01-04 RX ADMIN — CYCLOBENZAPRINE 10 MG: 10 TABLET, FILM COATED ORAL at 16:51

## 2024-01-04 RX ADMIN — MIDAZOLAM HYDROCHLORIDE 2 MG: 1 INJECTION, SOLUTION INTRAMUSCULAR; INTRAVENOUS at 10:44

## 2024-01-04 RX ADMIN — CEFAZOLIN 2 G: 330 INJECTION, POWDER, FOR SOLUTION INTRAMUSCULAR; INTRAVENOUS at 11:20

## 2024-01-04 RX ADMIN — CEFAZOLIN SODIUM 2 G: 2 INJECTION, SOLUTION INTRAVENOUS at 18:32

## 2024-01-04 RX ADMIN — ESMOLOL HYDROCHLORIDE 30 MG: 100 INJECTION, SOLUTION INTRAVENOUS at 11:59

## 2024-01-04 RX ADMIN — APREPITANT 40 MG: 40 CAPSULE ORAL at 10:18

## 2024-01-04 RX ADMIN — KETOROLAC TROMETHAMINE 30 MG: 30 INJECTION, SOLUTION INTRAMUSCULAR at 12:59

## 2024-01-04 RX ADMIN — SODIUM CHLORIDE 100 ML/HR: 9 INJECTION, SOLUTION INTRAVENOUS at 16:51

## 2024-01-04 RX ADMIN — METHOCARBAMOL 1000 MG: 100 INJECTION, SOLUTION INTRAMUSCULAR; INTRAVENOUS at 15:22

## 2024-01-04 RX ADMIN — ACETAMINOPHEN 650 MG: 325 TABLET ORAL at 16:51

## 2024-01-04 RX ADMIN — ROCURONIUM BROMIDE 10 MG: 10 INJECTION INTRAVENOUS at 12:27

## 2024-01-04 RX ADMIN — NEOSTIGMINE METHYLSULFATE 5 MG: 1 INJECTION INTRAVENOUS at 13:28

## 2024-01-04 RX ADMIN — ACETAMINOPHEN 650 MG: 325 TABLET ORAL at 23:00

## 2024-01-04 SDOH — SOCIAL STABILITY: SOCIAL INSECURITY: DO YOU FEEL ANYONE HAS EXPLOITED OR TAKEN ADVANTAGE OF YOU FINANCIALLY OR OF YOUR PERSONAL PROPERTY?: NO

## 2024-01-04 SDOH — SOCIAL STABILITY: SOCIAL INSECURITY: ABUSE: ADULT

## 2024-01-04 SDOH — SOCIAL STABILITY: SOCIAL INSECURITY: HAS ANYONE EVER THREATENED TO HURT YOUR FAMILY OR YOUR PETS?: NO

## 2024-01-04 SDOH — SOCIAL STABILITY: SOCIAL INSECURITY: ARE THERE ANY APPARENT SIGNS OF INJURIES/BEHAVIORS THAT COULD BE RELATED TO ABUSE/NEGLECT?: NO

## 2024-01-04 SDOH — SOCIAL STABILITY: SOCIAL INSECURITY: WERE YOU ABLE TO COMPLETE ALL THE BEHAVIORAL HEALTH SCREENINGS?: YES

## 2024-01-04 SDOH — SOCIAL STABILITY: SOCIAL INSECURITY: DOES ANYONE TRY TO KEEP YOU FROM HAVING/CONTACTING OTHER FRIENDS OR DOING THINGS OUTSIDE YOUR HOME?: NO

## 2024-01-04 SDOH — SOCIAL STABILITY: SOCIAL INSECURITY: ARE YOU OR HAVE YOU BEEN THREATENED OR ABUSED PHYSICALLY, EMOTIONALLY, OR SEXUALLY BY ANYONE?: NO

## 2024-01-04 SDOH — SOCIAL STABILITY: SOCIAL INSECURITY: HAVE YOU HAD THOUGHTS OF HARMING ANYONE ELSE?: NO

## 2024-01-04 SDOH — SOCIAL STABILITY: SOCIAL INSECURITY: DO YOU FEEL UNSAFE GOING BACK TO THE PLACE WHERE YOU ARE LIVING?: NO

## 2024-01-04 ASSESSMENT — ACTIVITIES OF DAILY LIVING (ADL)
GROOMING: INDEPENDENT
FEEDING YOURSELF: INDEPENDENT
DRESSING YOURSELF: INDEPENDENT
WALKS IN HOME: INDEPENDENT
PATIENT'S MEMORY ADEQUATE TO SAFELY COMPLETE DAILY ACTIVITIES?: YES
ADEQUATE_TO_COMPLETE_ADL: YES
JUDGMENT_ADEQUATE_SAFELY_COMPLETE_DAILY_ACTIVITIES: YES
ADL_ASSISTANCE: INDEPENDENT
HEARING - LEFT EAR: FUNCTIONAL
TOILETING: INDEPENDENT
BATHING: INDEPENDENT
HEARING - RIGHT EAR: FUNCTIONAL

## 2024-01-04 ASSESSMENT — COGNITIVE AND FUNCTIONAL STATUS - GENERAL
DAILY ACTIVITIY SCORE: 22
DRESSING REGULAR LOWER BODY CLOTHING: A LITTLE
CLIMB 3 TO 5 STEPS WITH RAILING: A LITTLE
MOBILITY SCORE: 18
STANDING UP FROM CHAIR USING ARMS: A LITTLE
WALKING IN HOSPITAL ROOM: A LITTLE
CLIMB 3 TO 5 STEPS WITH RAILING: A LOT
TOILETING: A LITTLE
TURNING FROM BACK TO SIDE WHILE IN FLAT BAD: A LITTLE
MOVING TO AND FROM BED TO CHAIR: A LITTLE
MOBILITY SCORE: 23

## 2024-01-04 ASSESSMENT — LIFESTYLE VARIABLES
HOW OFTEN DO YOU HAVE A DRINK CONTAINING ALCOHOL: MONTHLY OR LESS
PRESCIPTION_ABUSE_PAST_12_MONTHS: NO
HOW MANY STANDARD DRINKS CONTAINING ALCOHOL DO YOU HAVE ON A TYPICAL DAY: 1 OR 2
HOW OFTEN DO YOU HAVE 6 OR MORE DRINKS ON ONE OCCASION: NEVER
AUDIT-C TOTAL SCORE: 1
AUDIT-C TOTAL SCORE: 1
SUBSTANCE_ABUSE_PAST_12_MONTHS: NO
SKIP TO QUESTIONS 9-10: 1

## 2024-01-04 ASSESSMENT — COLUMBIA-SUICIDE SEVERITY RATING SCALE - C-SSRS
2. HAVE YOU ACTUALLY HAD ANY THOUGHTS OF KILLING YOURSELF?: NO
6. HAVE YOU EVER DONE ANYTHING, STARTED TO DO ANYTHING, OR PREPARED TO DO ANYTHING TO END YOUR LIFE?: NO
1. IN THE PAST MONTH, HAVE YOU WISHED YOU WERE DEAD OR WISHED YOU COULD GO TO SLEEP AND NOT WAKE UP?: NO

## 2024-01-04 ASSESSMENT — PAIN SCALES - GENERAL
PAINLEVEL_OUTOF10: 6
PAINLEVEL_OUTOF10: 0 - NO PAIN
PAINLEVEL_OUTOF10: 4
PAINLEVEL_OUTOF10: 10 - WORST POSSIBLE PAIN
PAINLEVEL_OUTOF10: 3
PAINLEVEL_OUTOF10: 5 - MODERATE PAIN
PAINLEVEL_OUTOF10: 4
PAINLEVEL_OUTOF10: 6
PAINLEVEL_OUTOF10: 5 - MODERATE PAIN
PAINLEVEL_OUTOF10: 5 - MODERATE PAIN
PAINLEVEL_OUTOF10: 4
PAINLEVEL_OUTOF10: 3
PAINLEVEL_OUTOF10: 8
PAINLEVEL_OUTOF10: 6
PAINLEVEL_OUTOF10: 4
PAINLEVEL_OUTOF10: 7
PAINLEVEL_OUTOF10: 4
PAINLEVEL_OUTOF10: 3

## 2024-01-04 ASSESSMENT — PAIN - FUNCTIONAL ASSESSMENT
PAIN_FUNCTIONAL_ASSESSMENT: 0-10

## 2024-01-04 ASSESSMENT — PATIENT HEALTH QUESTIONNAIRE - PHQ9
2. FEELING DOWN, DEPRESSED OR HOPELESS: NOT AT ALL
SUM OF ALL RESPONSES TO PHQ9 QUESTIONS 1 & 2: 0
1. LITTLE INTEREST OR PLEASURE IN DOING THINGS: NOT AT ALL

## 2024-01-04 ASSESSMENT — PAIN DESCRIPTION - DESCRIPTORS: DESCRIPTORS: SHARP;THROBBING

## 2024-01-04 NOTE — HOSPITAL COURSE
43 year-old F who presented with L MELVINA. Patient is now s/p L MELVINA on 1/4 by Dr. Jefferson. On the day of surgery, patient was identified in the pre-operative holding area and agreeable to proceed with surgery. Written consent was obtained.  Please see operative note for further details of this procedure. Patient received 24 hours of bowen-operative antibiotics. Patient recovered in the PACU before transfer to a regular nursing floor. Patient was started on multimodal pain control and ASA 81 mg bid for DVT prophylaxis. Physical therapy recommended continued recovery at home with continued physical therapy and wound care. On the day of discharge, patient was afebrile with stable vital signs. Patient was neurovascularly intact at time of discharge. Patient was discharged with prescription of ASA 81 mg bid for DVT prophylaxis for 4 weeks. Patient will follow-up with Dr. Jefferson in two weeks for post-operative visit.

## 2024-01-04 NOTE — ANESTHESIA PREPROCEDURE EVALUATION
Patient: Angel Chadwick    Procedure Information       Date/Time: 01/04/24 0915    Procedure: Arthroplasty Total Hip Posterior Approach (Left: Hip) - ALSO REGIONAL BLOCK    Location: [facility] OR 08 / [facility] [place] [facility]    Surgeons: Frantz Jefferson MD            Relevant Problems   Anesthesia (within normal limits)      Cardiovascular (within normal limits)      Endocrine (within normal limits)      GI (within normal limits)  gastroenteritis      /Renal (within normal limits)      Neuro/Psych (within normal limits)      Pulmonary (within normal limits)      GI/Hepatic (within normal limits)  Recent ED evaluation for abdominal pain- workup negative. Denied n/v/reflux symptoms.      Hematology (within normal limits)      Musculoskeletal   (+) Primary osteoarthritis of left hip      Eyes, Ears, Nose, and Throat (within normal limits)      Infectious Disease (within normal limits)       Clinical information reviewed:    Allergies  Meds               NPO Detail:  NPO/Void Status  Date of Last Liquid: 01/03/24  Time of Last Liquid: 2200  Date of Last Solid: 01/03/24  Time of Last Solid: 1800  Last Intake Type: Clear fluids         Physical Exam    Airway  Mallampati: III  TM distance: >3 FB  Neck ROM: full     Cardiovascular    Dental    Pulmonary    Abdominal      Other findings: Full beard and mustache- may be difficult mask ventilation      Anesthesia Plan    ASA 2     general   (GA ETT)  Anesthetic plan and risks discussed with patient.  Use of blood products discussed with patient who consented to blood products.

## 2024-01-04 NOTE — OP NOTE
Arthroplasty Total Hip Operative Note     Date: 2024  OR Location: [facility] OR    Name: Angel Chadwick, : 1980, Age: 43 y.o., MRN: [id number], Sex: male    Diagnosis  Pre-op Diagnosis     * Primary osteoarthritis of left hip [M16.12] Post-op Diagnosis     * Primary osteoarthritis of left hip [M16.12]     Procedures  Arthroplasty Total Hip Posterior Approach  94496 - [place] ARTHRP ACETBLR/PROX FEM PROSTC AGRFT/ALGRFT  This case was of increased complexity operative time given the patient's significant preoperative deformity of his femur and acetabulum.      Surgeons      * Frantz Jefferson - Primary    Resident/Fellow/Other Assistant:  Surgeon(s) and Role:     * Frantz Lopez MD - Resident - Assisting  First Assistant: Collette Mckenzie PA-C, who was critical and essential as a first assistant as there was no qualified resident available.      Procedure Summary  Anesthesia: General  ASA: II  Anesthesia Staff:   Anesthesiologist: Martha Massey MD  CRNA: BRUNO Lopez-CRNA  Anesthesia Resident: Kehinde Vanessa MD  Estimated Blood Loss: Please See Anesthesia Record  Intra-op Medications:   Medication Name Total Dose   BUPivacaine HCl (Marcaine) 0.25 % (2.5 mg/mL) injection 30 mL   ketorolac (Toradol) injection 30 mg   vancomycin (Vancocin) vial for injection 1 g   tobramycin (Nebcin) injection 1,200 mg   sodium chloride 0.9 % irrigation solution 3,000 mL   sodium chloride bacteriostatic 0.9 % injection 30 mL              Anesthesia Record               Intraprocedure I/O Totals       None           Specimen: No specimens collected     Staff:   Circulator: Clarisa Freedmna RN  Relief Circulator: Dao Jenkins RN  Relief Scrub: Rajat Odom  Scrub Person: Nasseem Awadallah      Drains and/or Catheters: * None in log *    Tourniquet Times: not used        Implants:  Implants       Type Name Action Serial No.      Joint Hip ACETABULAR CUP, SECTOR, GRIPTON, SIZE 52MM - [id number] Implanted       Screw SCREW CANCELLOUS 6.5 X 30 - [id number] Implanted      Screw SCREW CANCELLOUS 6.5 X 20 - [id number] Implanted      Screw SCREW CANCELLOUS 6.5 X 20 - [id number] Implanted      Joint Hip LINER, ALTRX, NEURTAL, 36 X 52MM - [id number] Implanted      Joint Hip STEM, FEMORAL, ACTIS COLLAR, STD, SIZE 6 - [id number] Implanted      Joint Hip FEMORAL HEAD, CERAMIC 36 +1.5 - [id number] Implanted               Findings: Please see below    Indications: Angel Chadwick is an 43 y.o. male who is having surgery for Primary osteoarthritis of left hip [M16.12].     The patient was seen in the preoperative area. The risks, benefits, complications, treatment options, non-operative alternatives, expected recovery and outcomes were discussed with the patient. The possibilities of reaction to medication, pulmonary aspiration, injury to surrounding structures, bleeding, recurrent infection, the need for additional procedures, failure to diagnose a condition, and creating a complication requiring transfusion or operation were discussed with the patient. The patient concurred with the proposed plan, giving informed consent.  The site of surgery was properly noted/marked if necessary per policy. The patient has been actively warmed in preoperative area. Preoperative antibiotics have been ordered and given within 1 hours of incision. Venous thrombosis prophylaxis have been ordered including unilateral sequential compression device    Procedure Details: The patient was met in the preoperative holding area and identified by name and medical record number.  They were transferred to the operating room and positioned lateral on a pegboard positioner with a gel pad under the axilla.  All bony prominences were padded.  Preoperative antibiotic prophylaxis and Tranexamic acid were administered.  General anesthesia was induced prior to positioning.  The correct operative site was identified.  Preoperative timeout was performed prior to prepping and  draping.  The correct operative site was then prepped and draped in the usual sterile fashion using ChloraPrep.  A curvilinear incision was then made centering over the greater trochanter.  The skin and subcutaneous tissue were dissected with a 10 blade scalpel.  Bovie electrocautery was then used to obtain hemostasis.  The iliotibial band and gluteus emily fascia were divided and a Charnley self-retaining retractor was placed at this layer.  The posterior edge of the medius was identified and retracted anteriorly with a cobra retractor.  The fascia between the gluteus minimus and the piriformis was then identified.  The gluteus minimus was then retracted anteriorly along with the medius.  The piriformis and posterior lateral hip capsule was then released off of the posterior edge of the greater trochanter in a trapezoidal capsulotomy.  Tag suture was then placed at the piriformis tendon and the capsule.  The hip was then dislocated through the capsulotomy.  At this point it was noted that the proximal femoral anatomy was severely altered and flattened and widened.  We removed residual soft tissue from the residual piriformis insertion and expose the femoral neck.  He had a very shortened neck.  Complex A femoral neck osteotomy was then made after palpating the lesser trochanter and measuring the femoral neck cut.  An oscillating saw was then used to perform the osteotomy and the femoral head and neck were removed.  The femur was then retracted anteriorly with an anterior femoral retractor.  The acetabulum was exposed with a sharp Hohmann in the obturator foramen.  The acetabular shape was highly complicated and very wide and as well as with a flattened roof.  There was significant redundant anterior capsule that was noted.  This was excised.  Peripheral soft tissue structures including the labrum and redundant capsule were excised with a 10 blade scalpel and a Kocher clamp.  The pulvinar was then excised with  electrocautery.  We then began to prepare the acetabular socket with hemispherical reamers.  This was significantly challenging as we for started to bury a 49 reamer to increase removal of superior bone given the shape and needing to turn the socket shape and do a hemisphere.  We widened this up to a 55 for a 56 cup.  His hip center likely was raised during this but was unavoidable.  There was excellent stick in the pelvis with the trial.    We then impacted the socket final implant in about 40 to 45 degrees of abduction and 30-35 degrees of anteversion.  We used the external alignment kiana to measure cup position and was very happy with the overall position.  The  was removed and a trial liner was placed.  We then flexed and internally rotated the femur and placed a 2 prong retractor beneath the calcar.  We then prepared the proximal femur using a box osteotome followed by a canal finder to bluntly enter the proximal femur.  We then used the Kincise broaching device to prepare the proximal femur up to the appropriate size with a good fit and fill of the proximal femur.  We had to use femoral shaft reamers as well to ensure adequate sizing.  The proximal femur was widened but I was able to get good fit and fill in both directions.  Rotational stability with the final broach was excellent in the femur.  We then placed a trial neck and head and took intraoperative x-ray.  After obtaining a good quality film we then assessed limb lengths and were quite happy with overall component position and improvement of limb length.  Based on teardrop line because he had a hypoplastic left ischium and hemipelvis, he was about 3 cm short to start.  We lengthened him about 15 mm to make up at the distance.  I did not want to lengthen more than 2 cm.  He did not have much of a perceived limb length inequality either.  I did not want to make him perceive it exceptionally long leg.  We then removed all trial components and  placed two cancellous acetabular screws to secure the cup.  We then placed a 0 degree highly cross-linked polyethylene liner in the socket.  We then reexposed the proximal femur and placed the final femoral stem implant followed by the final head implant after one final trial. I then scrutinized the femoral neck and calcar area for any possibility of an intra-op fracture. The calcar was intact. Limb lengths felt symmetric clinically.  She was quite stable and anterior and posterior positions of vulnerability.  Soft tissue tension was restored.  We then copiously irrigated the wound with Irrisept and normal saline.  The soft tissue of the capsule and piriformis were then repaired through bone tunnels in the posterior greater trochanter.  Antibiotic powder was placed deeply to prevent infection.  Hemostasis was obtained and no drain was required.  We then repaired the wound in a standard fashion in layers using #1 Vicryl, 2-0 Vicryl, and 3-0 Monocryl and Dermabond at the skin.  A sterile dressing was applied.  I was present for the key and critical aspects of the procedure.    Postoperative plan:  Weightbearing as tolerated with posterior hip precautions.  24 hours of antibiotics for infection prophylaxis.  Calcium and vitamin D protocol for bone health.  DVT prophylaxis with aspirin.  I will see her back in the office in 2 weeks time for wound check, standing AP pelvis, and AP and crosstable lateral view of the hip.  Complications:  None; patient tolerated the procedure well.    Disposition: PACU - hemodynamically stable.  Condition: stable         Additional Details: None additional    Attending Attestation: I was present and scrubbed for the key portions of the procedure.    Frantz Jefferson  Phone Number: [phone]

## 2024-01-04 NOTE — PROCEDURES
Peripheral Block    Patient location during procedure: pre-op  Start time: 1/4/2024 9:25 AM  End time: 1/4/2024 9:32 AM  Reason for block: at surgeon's request  Staffing  Performed: resident   Authorized by: Mary Horn MD    Performed by: Mary Horn MD  Preanesthetic Checklist  Completed: patient identified, IV checked, site marked, risks and benefits discussed, surgical consent, monitors and equipment checked, pre-op evaluation and timeout performed   Timeout performed at: 1/4/2024 9:25 AM  Peripheral Block  Patient position: laying flat  Prep: ChloraPrep  Patient monitoring: heart rate and continuous pulse ox  Block type: QL  Laterality: left  Injection technique: single-shot  Guidance: ultrasound guided  Local infiltration: lidocaine  Needle  Needle type: [place].   Needle gauge: 22 G  Needle length: 8 cm  Needle localization: ultrasound guidance     image stored in chart  Assessment  Injection assessment: negative aspiration for heme, no paresthesia on injection, incremental injection and local visualized surrounding nerve on ultrasound  Additional Notes  QL single shot. informed consent obtained. risks and benefits discussed. ASA monitors placed, timeout performed. Pt positioned, prepped with chlorhexidine, draped with sterile towels. Ultrasound guidance used with visualization of the needle throughout duration of the procedure. Aspiration was negative. A total of 25 cc 0.5% ropivacaine, 100mcg epinephrine, and 4mg decadron injected between both sides. Patient tolerated procedure well.     Timeout by RN

## 2024-01-04 NOTE — HH CARE COORDINATION
Home Care received a Referral for Physical Therapy and Occupational Therapy. We have processed the referral for a Start of Care on 01/06/2024.      If you have any questions or concerns, please feel free to contact us at [phone]. Follow the prompts, enter your five digit zip code, and you will be directed to your care team on [facility].

## 2024-01-04 NOTE — DISCHARGE INSTRUCTIONS
[facility]  Adult Reconstruction and Joint Replacement Surgery  Phone: [phone]     Fax:[phone]              DISCHARGE INSTRUCTIONS      PLEASE READ CAREFULLY BEFORE CONTACTING YOUR PROVIDER.    WE WORK COLLABORATIVELY AS A TEAM. CALLING MULTIPLE STAFF MEMBERS REGARDING THE SAME ISSUE WILL DELAY YOUR CARE.    [employer] IS THE PREFERRED COMMUNICATION FOR ALL TEAM MEMBERS.    POSTOPERATIVE INSTRUCTIONS: TOTAL HIP ARTHROPLASTY    JOINT CARE TEAM  Please use the information below to contact your care team following surgery.  If you are leaving a message or using the [facility] Chart portal, please include your full name, date of birth and date of surgery so that we can correctly identify you.  Your call will be returned within 1-2 business days, please do not leave multiple messages with other staff regarding a single issue while you are awaiting a return call.     Who to call Contact Information Matters needing handled   Collette Mckenzie PA-C  Physician Assistant [employer] [facility]   Prescription Refills   Medical questions/concerns       Justina Baptiste MBA, BSN, RN-BC  Ortho Coordinator FRAN Gao  Ortho Nurse Navigator   [phone] Nursing, medical question related questions or concerns within 6 weeks of surgery   Orders for Outpatient Physical Therapy  Prescription refills         Soledad Manuel -    [email or web address]           [phone]         Prescription Refills  Scheduling office Visits  Medical questions/concerns  Leave of Absence or other paperwork  Any concerns more than 6 weeks from surgery - an appointment will need to be made     MEDICATION REFILLS - Collette Mckenzie PA-C ([employer]) or Soledad Manuel ([phone])    -You will NOT receive a call indicating that your prescription has been filled.  Please contact your pharmacy with any questions.    Medication refills will be filled Monday-Friday  7am to 1pm ONLY. Please call the office or send a [facility] PH[facility]Y message for a refill request.  Any requests received outside of this timeframe will be handled on the next business day.  Please do not call multiple times or call other members of the care team for medication needs, this will cause the refill to take longer.    Per State and Institutional policy, pain medications can only be refilled every 7 days for up to six weeks following surgery.    My Chart Portal: If you are using the My Chart portal and are requesting a medication refill, please list what type of surgery you had and left or right side, medication that needs refilled, and pharmacy you would like your medication sent.     WEIGHT BEARING - as tolerated with posterior hip precautions taught by therapy    ACTIVITY - As Tolerated    DRIVING & TRAVEL AFTER SURGERY   Patients should anticipate waiting at least 4-6 weeks before traveling long distances after surgery.  You will need to stop to walk around ever 1 hour during your travel to help with blood clot prevention.    Patients may not drive until cleared by the joint nurse or the office and you are off of all narcotics.    DENTAL PROCEDURES & CLEANINGS  You must wait a minimum of 3 months for elective dental appointments after a total joint replacement, including routine cleanings or dental work including bridges, crowns, extractions, etc.. Unless, it is an emergency. You will need a prophylactic antibiotic lifelong prior to any dental visit, cleaning or procedure. Your surgeons office or your dentist may provide a prescription antibiotic. Antibiotics are a lifelong need before dental appointments.      You do not need antibiotics for endoscopic procedures such as colononoscopy or EGD, dematologic biopsies or eye surgeries.    WOUND CARE  If you experience continued drainage or bleeding, you may cover with abdominal/Maxi pads (purchase at local drug store).  Knee replacements should wrap with an ace  wrap.  You may shower with waterproof dressing on. Your surgical bandage will be removed by your home therapist 1 week after surgery. If you have staples intact, home care will remove in 2 weeks. If you have sutures intact, you will need to return to the office in 2 weeks for suture removal. Once the dressing is removed by home care, you may continue to shower. Let soap and water wash over the wound. DO NOT SCRUB.  Steri-strips under the bandage will remain in place until they fall off on their own.  If they are loose, you may gently remove.  If they have not fallen off in two weeks, gently peel them off. Do not remove if pulling causes resistance against the incision.  You will see suture tails sticking out of the ends of the incision.  DO NOT CUT THEM.  They will fall off when the sutures dissolve.  If they are bothersome, cover with a band aid.  Do not soak in a bath tub, hot tub, pool or lake until you are 8 weeks out from surgery.  Do not apply lotions, creams or ointments until you are 6 weeks out from surgery,    PAIN, SWELLING, BRUISING & CLICKING  Pain and swelling are a natural part of your recovery which is considered normal for up to a year after surgery.  Symptoms may be treated with movement, ice, compression stockings, elevating your leg, and by following the pain medication regimen as prescribed.  Bruising is normal for several weeks after surgery. You may also have leg swelling and pain in your shin.  You may ice areas that are tender to help with discomfort.  You are required to wear the provided compression stockings, every day, for 4 weeks following surgery.  Remove the stockings at night and place them back on in the morning.  Pain and swelling may temporarily increase with an increase in activity or exercise.  Use ice after activity.  Audible clicking with movement or exercises is considered normal following joint replacement.  If this persists at 6 months or 1 year, please notify your  surgeon.  You may also feel decreased sensation or numbness near the incision site.  This is normal and sensation may or may not return.    PERSONAL HYGIENE  You may shower upon discharging from the hospital.  Soap and water is permitted to run over the surgical dressing.  Do not scrub directly over these items.  DO NOT soak your incision in a bath, hot tub, pool or pond/lake for a minimum of 8 weeks following your surgery.  DO NOT use lotions, creams, ointments on your wound for a minimum of 6 weeks following your surgery. At that time you may use vitamin E to assist with softening of your incision.      RESTARTING HOME ROUTINE - DIET & MEDICATIONS  Post-operative constipation can result due to a combination of inactivity, anesthesia and pain medication. To help prevent this, you should increase your water and fiber intake. Physical activity such as walking will also help stimulate the bowels.   You may resume your normal diet when you discharge home.  Choose foods that help promote good bowel habits and prevent constipation, such as foods high in fiber.  You may restart your home medications the following day after your surgery UNLESS you have been given alternate instructions.  Follow the instructions given to you on your hospital discharge instructions for more information regarding your home medications.      IN-HOME PHYSICAL THERAPY & OUTPATIENT PHYSICAL THERAPY  In-home physical therapy will start 1-2 days after you get home from the hospital.    The home care agency will call within the first 24-48 hours to set up their first visit.  Please do not call your care team to inquire during this timeframe.  Continue the exercises you were given in the hospital until you have been seen by in-home therapy.  Make sure to provide a phone number with the ability for the home care staff to leave a message if you do not answer your phone.    You may choose any outpatient physical therapy location.      EMERGENCIES - WHEN  TO CONTACT THE SURGEON'S OFFICE IMMEDIATELY  Fever >101 with chills that has been present for at least 48 hours.   Excessive bleeding from incision that will not slow down. A small amount of drainage is normal and expected.  Once pressure is applied and the area is covered, do not continue to check the area regularly.  This will remove pressure and bleeding will continue.  Leave in place for 4-6 hours.  Signs of infection of incision-excessive drainage that is soaking through your dressing (especially if it is pus-like), redness that is spreading out from the edges of your incision, or increased warmth around the area.  Excruciating pain for which the pain medication, taken as instructed, is not helping.  Severe calf pain.  Go directly to the emergency room or call 911, if you are experiencing chest pain or difficulty breathing.    ICE & COLD THERAPY INSTRUCTIONS    To assist with pain control and post-op swelling, you should be using ice regularly throughout recovery, especially for the first 6 weeks, regardless of the cold therapy method you use.      Always make sure there is a layer of protection between the cold pad and your skin.    If you are using ICE PACKS or GEL PACKS, you will need to alternate 20 minutes on, 20 minutes off twice per hour.    If you are using an ICE MACHINE, please follow the provided ice machine instructions.  These devices differ from ice or ice packs whereas the mechanism circulates water through tubing and a pad to provide longer periods of cold therapy to the desired site.  You can use your cold devices around the clock for optimal comfort.  We recommend using cold therapy after working with therapy or completing exercises on your own.  There is no set schedule in which you must follow while using cold therapy.  Below are a few points to remember when using a cold therapy device:    You do not need to need to use the 20 on, 20 off method.  Detach the pad from the cooler and ambulate  at least once every hour.  You can check your skin under the pad at this time.  You may wear the cold therapy device during periods of sleep including overnight.  If you wake up during the night, you can check the skin at this time.  You do not need to wake up specifically to perform skin checks.  Empty the cooler and pad when device is not in use.  Follow 's instructions for cleaning your cold therapy device.    DISCHARGE MEDICATIONS - Please reference the sample schedule on the reverse side for instructions on how to best schedule medications.    PAIN MEDICATION    ___X_ Tramadol / Oxycodone  Tramadol and Oxycodone have been prescribed for post-operative pain control.    These medications will only be refilled ONCE every 7 days for a period of up to 6 weeks following surgery.  After 6 weeks, you will transition to acetaminophen and over -the- counter anti-inflammatories such as Ibuprofen, Advil or Aleve in conjunction with ICE/COLD THERAPY.   Side effects may be constipation and nausea, vomiting, sleepiness, dizziness, lightheadedness, headache, blurred vision, dry mouth sweating, itching (if you have itching, over-the -counter Benadryl can be used as needed).  You may NOT operate a motor vehicle while taking these medications or have been cleared by your care team.     ___X_ Acetaminophen (Tylenol)  Acetaminophen has been prescribed as an adjunct for pain control. Take two 500 mg tablets every 6 hours for 4 weeks. You will not receive a refill on this medication.  Do not exceed 4000mg of acetaminophen within a 24 hour period.  Side effects may include nausea, heartburn, drowsiness, and headache.    ___X_ Meloxicam (Mobic)-Meloxicam has been prescribed as an adjunct anti-inflammatory to assist in pain control.    Take one 15mg tablet once daily for 4 weeks.  You will not receive refills on this medication.   Side effects may include nausea.  May not be prescribed if you are on a more potent blood  thinner than aspirin or have chronic kidney disease.    BLOOD THINNER    ___X_ Blood Thinner   A blood thinner has been prescribed to prevent blood clots in your leg or lungs. Take as prescribed on the bottle for 4 weeks. You will not receive a refill on this medication.      ANTI NAUSEA    ___X_ Proton Pump Inhibitor (PPI)-Stomach Acid Reduction Medication  If you are already on a PPI, you will continue your regular medication. If you are not, you will be prescribed Pantoprazole to help with nausea and protect your stomach while taking pain medication.  You will not receive a refill on this medication.    STOOL SOFTENERS    ___X_ Colace (Docusate Sodium) & Senna   Take both medications to help with constipation while using the Oxycodone and Tramadol for pain control.  You will not receive a refill on this medication.    Continued Constipation  If you continue to be constipated despite daily use of Miralax and colace, you try an over-the-counter Dulcolax Suppository and use per instructions on the package.     SPECIAL INSTRUCTIONS   ***    You will not receive refills on the following medications.   Acetaminophen (Tylenol  Meloxicam  Miralax  Colace  Pantoprazole  Blood Thinner    Pain Medication Refills -[phone] or MyChart- Monday through Friday 7am-1pm    FOLLOW-UP- You should have an appointment with Collette KATE in 2 weeks.         SAMPLE              The times below are an example of how to organize medications to optimize pain control  Your actual medication schedule may vary based on your last dose taken IN THE HOSPITAL      Time 3:00 am 6:00 am 9:00 am 12:00 pm 3:00 pm 6:00 pm 9:00 pm 12:00 am   Medications Tramadol Tylenol  Oxycodone  Miralax   Blood Thinner  Colace  Pantoprazole or other PPI  Tramadol  Meloxicam Tylenol  Oxycodone Tramadol Tylenol  Oxycodone  Miralax Blood Thinner  Colace  Tramadol   Tylenol  Oxycodone            You may begin to wean off the pain medication as your pain remains  controlled with increased activity.  The schedules provided are meant to serve as an example.  You may wean off based on your pain control.  Please note that pain medications are not filled beyond 6 weeks after surgery.              The times below are an example of how to WEAN OFF medications WHILE CONTINUING TO OPTIMIZE PAIN CONTROL.  Your actual medication schedule may vary based on your last dose taken.  Time 12:00am 4:00am 8:00am 12:00pm 4:00pm 8:00pm   Med Tramadol Oxycodone   Tramadol Oxycodone Tramadol Oxycodone     Time 12:00am 6:00am 12:00pm 6:00pm   Med Tramadol Oxycodone   Tramadol Oxycodone     Time 12:00am 8:00am 4:00pm   Med Tramadol Oxycodone   Tramadol     Time 12:00am 12:00pm   Med Tramadol Tramadol

## 2024-01-04 NOTE — ANESTHESIA PROCEDURE NOTES
Airway  Date/Time: 1/4/2024 11:10 AM  Urgency: elective    Airway not difficult    Staffing  Performed: resident   Authorized by: Martha Massey MD    Performed by: Kehinde Vanessa MD  Patient location during procedure: OR    Indications and Patient Condition  Indications for airway management: anesthesia  Spontaneous Ventilation: absent  Sedation level: deep  Preoxygenated: yes  Patient position: sniffing  Mask difficulty assessment: 2 - vent by mask + OA or adjuvant +/- NMBA (One hand mask with oral airway in place)  Planned trial extubation    Final Airway Details  Final airway type: endotracheal airway      Successful airway: ETT  Cuffed: yes   Successful intubation technique: direct laryngoscopy  Facilitating devices/methods: intubating stylet  Endotracheal tube insertion site: oral  Blade: Merissa  Blade size: #4  ETT size (mm): 7.5  Cormack-Lehane Classification: grade IIa - partial view of glottis  Placement verified by: chest auscultation and capnometry   Measured from: lips  ETT to lips (cm): 23  Number of attempts at approach: 1

## 2024-01-04 NOTE — ANESTHESIA POSTPROCEDURE EVALUATION
Patient: Angel Chadwick    Procedure Summary       Date: 01/04/24 Room / Location: [facility] OR 08 / [facility] [place] [facility]    Anesthesia Start: 1046 Anesthesia Stop: 1343    Procedure: Arthroplasty Total Hip Posterior Approach (Left: Hip) Diagnosis:       Primary osteoarthritis of left hip      (Primary osteoarthritis of left hip [M16.12])    Surgeons: Frantz Jefferson MD Responsible Provider: Martha Massey MD    Anesthesia Type: general ASA Status: 2            Anesthesia Type: general    Vitals Value Taken Time   /80 01/04/24 1345   Temp 36.3 C 01/04/24 1353   Pulse 72 01/04/24 1350   Resp 12 01/04/24 1350   SpO2 96 % 01/04/24 1350   Vitals shown include unvalidated device data.    Anesthesia Post Evaluation    Patient location during evaluation: PACU  Patient participation: complete - patient participated  Level of consciousness: sleepy but conscious  Pain management: adequate  Multimodal analgesia pain management approach  Airway patency: patent  Cardiovascular status: acceptable and hemodynamically stable  Respiratory status: acceptable and face mask  Hydration status: acceptable  Postoperative Nausea and Vomiting: none        No notable events documented.

## 2024-01-04 NOTE — H&P
[facility] Department of Orthopaedic Surgery   Surgical History & Physical <30 Days    Reason for Surgery: Left hip OA  Planned Procedure: Left Total hip arthroplasty    History & Physical Reviewed:  I have reviewed the History and Physical for obtained within the last 30 days. Relevant findings and updates are noted below:  No significant changes.    Home medications were reviewed with significant updates noted below:  No significant changes.    ERAS patient?: No    COVID-19 Risk Consent:   Surgeon has reviewed the key risks related to matt COVID-19 and subsequent sequelae.     01/04/24 at 5:09 AM - Frantz Lopez MD

## 2024-01-04 NOTE — CONSULTS
Consults  Acute Pain Service    Angel Chadwick is a 43 y.o. year old male patient who presents for left total hip arthroplasty with Dr. Jefferson on 1/4/24. Acute Pain consulted for block for postoperative pain control.     Anticipated Postop Pain Issues -   Palliative: typically relieved with IV analgesics and regional local anesthetics  Provocative: typically with movement  Quality: typically burning and aching  Radiation: typically none  Severity: typically severe 8-10/10  Timing: typically constant    Past Medical History:   Diagnosis Date    Abdominal pain     Arthritis     Chronic pain disorder     Gastroenteritis     Joint pain     Edxf-Wwrku-Dbsmjab disease     Diagnosed at age 7    Varicose vein of leg         Past Surgical History:   Procedure Laterality Date    OTHER SURGICAL HISTORY  02/23/2022    No history of surgery    WISDOM TOOTH EXTRACTION      age 18        No family history on file.     Social History     Socioeconomic History    Marital status: Single     Spouse name: Not on file    Number of children: Not on file    Years of education: Not on file    Highest education level: Not on file   Occupational History    Not on file   Tobacco Use    Smoking status: Never    Smokeless tobacco: Never   Vaping Use    Vaping Use: Never used   Substance and Sexual Activity    Alcohol use: Yes     Comment: socially    Drug use: Not Currently     Types: Marijuana    Sexual activity: Defer   Other Topics Concern    Not on file   Social History Narrative    Not on file     Social Determinants of Health     Financial Resource Strain: Not on file   Food Insecurity: Not on file   Transportation Needs: Not on file   Physical Activity: Not on file   Stress: Not on file   Social Connections: Not on file   Intimate Partner Violence: Not on file   Housing Stability: Not on file        No Known Allergies      Review of Systems  Gen: No fatigue, anorexia, insomnia, fever.   Eyes: No vision loss, double vision, drainage, eye  pain.   ENT: No pharyngitis, dry mouth, no hearing changes or ear discharge  Cardiac: No chest pain, palpitations, syncope, near syncope.   Pulmonary: No shortness of breath, cough, hemoptysis.   Heme/lymph: No swollen glands, fever, bleeding.   GI: No abdominal pain, change in bowel habits, melena, hematemesis, hematochezia, nausea, vomiting, diarrhea.   : No discharge, dysuria, frequency, urgency, hematuria.  Endo: No polyuria or weight loss.   Musculoskeletal: Negative for any pain or loss of ROM/weakness  Skin: No rashes or lesions  Neuro: Normal speech, no numbness or weakness. No gait difficulties  Review of systems is otherwise negative unless stated above or in history of present illness.    Physical Exam:  Constitutional:  no distress, alert and cooperative  Eyes: clear sclera  Head/Neck: No apparent injury, trachea midline  Respiratory/Thorax: Patent airways, thorax symmetric, breathing comfortably  Cardiovascular: no pitting edema  Gastrointestinal: Nondistended  Musculoskeletal: ROM intact  Extremities: no clubbing  Neurological: alert, valentine x4  Psychological: Appropriate affect    Angel Chadwick is a 43 y.o. year old male patient who presents for left total hip arthroplasty with Dr. Jefferson on 1/4/24. Acute Pain consulted for block for postoperative pain control.     Plan:    - Left quadratus lumborum SS blocks performed preoperatively on 1/4/24  - Pain medications per primary team  - Will see on POD1 if inpatient    Acute Pain Team  pg [phone] ph [phone].

## 2024-01-05 VITALS
OXYGEN SATURATION: 98 % | BODY MASS INDEX: 30.37 KG/M2 | HEART RATE: 72 BPM | RESPIRATION RATE: 18 BRPM | TEMPERATURE: 97.3 F | HEIGHT: 69 IN | WEIGHT: 205.03 LBS | DIASTOLIC BLOOD PRESSURE: 75 MMHG | SYSTOLIC BLOOD PRESSURE: 122 MMHG

## 2024-01-05 LAB
ERYTHROCYTE [DISTWIDTH] IN BLOOD BY AUTOMATED COUNT: 11.9 % (ref 11.5–14.5)
HCT VFR BLD AUTO: 31.5 % (ref 41–52)
HGB BLD-MCNC: 10.8 G/DL (ref 13.5–17.5)
MCH RBC QN AUTO: 31 PG (ref 26–34)
MCHC RBC AUTO-ENTMCNC: 34.3 G/DL (ref 32–36)
MCV RBC AUTO: 91 FL (ref 80–100)
NRBC BLD-RTO: 0 /100 WBCS (ref 0–0)
PLATELET # BLD AUTO: 192 X10*3/UL (ref 150–450)
RBC # BLD AUTO: 3.48 X10*6/UL (ref 4.5–5.9)
WBC # BLD AUTO: 10.9 X10*3/UL (ref 4.4–11.3)

## 2024-01-05 PROCEDURE — 97535 SELF CARE MNGMENT TRAINING: CPT | Mod: GO

## 2024-01-05 PROCEDURE — 97530 THERAPEUTIC ACTIVITIES: CPT | Mod: GP,CQ

## 2024-01-05 PROCEDURE — 97165 OT EVAL LOW COMPLEX 30 MIN: CPT | Mod: GO

## 2024-01-05 PROCEDURE — 97116 GAIT TRAINING THERAPY: CPT | Mod: GP,CQ

## 2024-01-05 PROCEDURE — 7100000011 HC EXTENDED STAY RECOVERY HOURLY - NURSING UNIT

## 2024-01-05 PROCEDURE — 36415 COLL VENOUS BLD VENIPUNCTURE: CPT | Performed by: PHYSICIAN ASSISTANT

## 2024-01-05 PROCEDURE — 85027 COMPLETE CBC AUTOMATED: CPT | Performed by: PHYSICIAN ASSISTANT

## 2024-01-05 PROCEDURE — 99231 SBSQ HOSP IP/OBS SF/LOW 25: CPT | Performed by: STUDENT IN AN ORGANIZED HEALTH CARE EDUCATION/TRAINING PROGRAM

## 2024-01-05 PROCEDURE — 2500000001 HC RX 250 WO HCPCS SELF ADMINISTERED DRUGS (ALT 637 FOR MEDICARE OP): Performed by: PHYSICIAN ASSISTANT

## 2024-01-05 PROCEDURE — G0378 HOSPITAL OBSERVATION PER HR: HCPCS

## 2024-01-05 PROCEDURE — 2500000004 HC RX 250 GENERAL PHARMACY W/ HCPCS (ALT 636 FOR OP/ED): Performed by: PHYSICIAN ASSISTANT

## 2024-01-05 RX ORDER — TRAMADOL HYDROCHLORIDE 50 MG/1
50 TABLET ORAL EVERY 6 HOURS PRN
Qty: 28 TABLET | Refills: 0 | Status: SHIPPED | OUTPATIENT
Start: 2024-01-05

## 2024-01-05 RX ADMIN — OXYCODONE HYDROCHLORIDE 10 MG: 5 TABLET ORAL at 13:49

## 2024-01-05 RX ADMIN — ACETAMINOPHEN 650 MG: 325 TABLET ORAL at 05:37

## 2024-01-05 RX ADMIN — KETOROLAC TROMETHAMINE 30 MG: 30 INJECTION, SOLUTION INTRAMUSCULAR; INTRAVENOUS at 11:32

## 2024-01-05 RX ADMIN — ACETAMINOPHEN 650 MG: 325 TABLET ORAL at 11:33

## 2024-01-05 RX ADMIN — OXYCODONE HYDROCHLORIDE 10 MG: 5 TABLET ORAL at 08:10

## 2024-01-05 RX ADMIN — ASPIRIN 81 MG: 81 TABLET, COATED ORAL at 08:10

## 2024-01-05 RX ADMIN — CYCLOBENZAPRINE 10 MG: 10 TABLET, FILM COATED ORAL at 08:10

## 2024-01-05 RX ADMIN — OXYCODONE HYDROCHLORIDE 10 MG: 5 TABLET ORAL at 02:58

## 2024-01-05 RX ADMIN — PANTOPRAZOLE SODIUM 40 MG: 40 TABLET, DELAYED RELEASE ORAL at 07:00

## 2024-01-05 RX ADMIN — KETOROLAC TROMETHAMINE 30 MG: 30 INJECTION, SOLUTION INTRAMUSCULAR; INTRAVENOUS at 04:15

## 2024-01-05 RX ADMIN — CEFAZOLIN SODIUM 2 G: 2 INJECTION, SOLUTION INTRAVENOUS at 03:49

## 2024-01-05 RX ADMIN — POLYETHYLENE GLYCOL 3350 17 G: 17 POWDER, FOR SOLUTION ORAL at 08:10

## 2024-01-05 ASSESSMENT — COGNITIVE AND FUNCTIONAL STATUS - GENERAL
CLIMB 3 TO 5 STEPS WITH RAILING: A LITTLE
TOILETING: A LITTLE
DRESSING REGULAR UPPER BODY CLOTHING: A LITTLE
MOVING TO AND FROM BED TO CHAIR: A LITTLE
WALKING IN HOSPITAL ROOM: A LITTLE
DRESSING REGULAR LOWER BODY CLOTHING: A LOT
MOBILITY SCORE: 19
DAILY ACTIVITIY SCORE: 17
WALKING IN HOSPITAL ROOM: A LITTLE
PERSONAL GROOMING: A LITTLE
MOVING TO AND FROM BED TO CHAIR: A LITTLE
STANDING UP FROM CHAIR USING ARMS: A LITTLE
STANDING UP FROM CHAIR USING ARMS: A LITTLE
MOBILITY SCORE: 19
HELP NEEDED FOR BATHING: A LOT
TURNING FROM BACK TO SIDE WHILE IN FLAT BAD: A LITTLE
TURNING FROM BACK TO SIDE WHILE IN FLAT BAD: A LITTLE
CLIMB 3 TO 5 STEPS WITH RAILING: A LITTLE

## 2024-01-05 ASSESSMENT — PAIN SCALES - GENERAL
PAINLEVEL_OUTOF10: 7
PAINLEVEL_OUTOF10: 5 - MODERATE PAIN
PAINLEVEL_OUTOF10: 3
PAINLEVEL_OUTOF10: 7
PAINLEVEL_OUTOF10: 4
PAINLEVEL_OUTOF10: 3
PAINLEVEL_OUTOF10: 5 - MODERATE PAIN
PAINLEVEL_OUTOF10: 7

## 2024-01-05 ASSESSMENT — PAIN - FUNCTIONAL ASSESSMENT
PAIN_FUNCTIONAL_ASSESSMENT: 0-10

## 2024-01-05 ASSESSMENT — ACTIVITIES OF DAILY LIVING (ADL)
ADL_ASSISTANCE: INDEPENDENT
BATHING_ASSISTANCE: MODERATE
HOME_MANAGEMENT_TIME_ENTRY: 2

## 2024-01-05 ASSESSMENT — PAIN DESCRIPTION - DESCRIPTORS
DESCRIPTORS: SORE;DULL
DESCRIPTORS: SORE;THROBBING

## 2024-01-05 NOTE — PROGRESS NOTES
Physical Therapy    Physical Therapy Treatment    Patient Name: Angel Chadwick  MRN: [id number]  Today's Date: 1/5/2024  Time Calculation  Start Time: 0923  Stop Time: 0950  Time Calculation (min): 27 min       Assessment/Plan   PT Assessment  PT Assessment Results: Decreased strength, Decreased endurance, Impaired balance, Decreased mobility  Rehab Prognosis: Excellent  Evaluation/Treatment Tolerance: Patient tolerated treatment well  Medical Staff Made Aware: Yes  End of Session Communication: Bedside nurse  End of Session Patient Position: Bed, 2 rail up, Alarm off, not on at start of session     PT Plan  Treatment/Interventions: Bed mobility, Transfer training, Gait training, Stair training, Balance training, Endurance training, Strengthening  PT Plan: Skilled PT  PT Frequency: BID  PT Discharge Recommendations: Low intensity level of continued care  PT Recommended Transfer Status: Assist x1  PT - OK to Discharge: Yes (pending medical clearance and ability to negotiate stairs to enter home.)      General Visit Information:   PT  Visit  PT Received On: 01/05/24  Response to Previous Treatment: Patient with no complaints from previous session.  General  Family/Caregiver Present: Yes  Caregiver Feedback: wife  Prior to Session Communication: Bedside nurse  Patient Position Received: Bed, 3 rail up, Alarm off, not on at start of session  General Comment: Pt supine in bed, pleasant and agreeable to therapy    Subjective   Precautions:  Precautions  LE Weight Bearing Status: Weight Bearing as Tolerated  Post-Surgical Precautions: Left hip precautions  Precautions Comment: Posterior hip precautions- pt offers good recall and compliance throughout session.  Vital Signs:       Objective   Pain:  Pain Assessment  Pain Assessment: 0-10  Pain Score: 4  Pain Location: Hip  Pain Orientation: Left  Pain Interventions: Cold applied (Ice pack to L hip provided at end of session.)    Treatments:  Therapeutic Exercise  Therapeutic  "Exercise Performed: Yes  Therapeutic Exercise Activity 1: L LE SLR 2x10, L LE LAQs 1x10    Therapeutic Activity  Therapeutic Activity Performed: Yes  Therapeutic Activity 1: Reviewed hip precautions with good pt recall, educated pt on importance of use/WB of LE as tolerated to avoid increased gait dysfunction or tissue shortening.    Bed Mobility  Bed Mobility: Yes  Bed Mobility 1  Bed Mobility 1: Supine to sitting, Sitting to supine  Level of Assistance 1: Close supervision    Ambulation/Gait Training  Ambulation/Gait Training Performed: Yes  Ambulation/Gait Training 1  Surface 1: Level tile  Device 1: Axillary crutches  Assistance 1: Close supervision  Quality of Gait 1:  (significantly limited WB L LE)  Comments/Distance (ft) 1: 100'x2  Transfers  Transfer: Yes  Transfer 1  Transfer From 1: Sit to, Stand to  Transfer to 1: Sit  Technique 1: Sit to stand, Stand to sit  Transfer Device 1: Crutches  Transfer Level of Assistance 1: Close supervision  Trials/Comments 1: from varied surfaces, good management of crutches and compliance with hip precautions noted  Transfers 2  Trials/Comments 2: Pt and wife verbally educated on safe car transfer technique.    Stairs  Stairs: Yes  Stairs  Rails 1: None (Comment)  Device 1: Axillary crutches  Support Devices 1: Gait belt  Assistance 1: Contact guard  Comment/Number of Steps 1: up/down 4 (6\") steps 2x with use of B axillary crutches, first trial with pt maintaining NWB L LE with descent, second trial with cues to step down with L LE, ensure at least TTWB to improve balance and control with descent, improved safety and control noted, pt reports no increased pain with use of L LE.  Stairs 2  Rails 2: None (Comment)  Curb Step 2: Yes  Device 2: Axillary crutches  Support Devices 2: Gait belt  Assistance 2: Close supervision  Comment/Number of Steps 2: up/down 4\" curb step 2x with CGA, min cues for sequence and to avoid NWB L LE with descent to improve safety, balance and " control.    Outcome Measures:     [facility] Basic Mobility  Turning from your back to your side while in a flat bed without using bedrails: None  Moving from lying on your back to sitting on the side of a flat bed without using bedrails: A little  Moving to and from bed to chair (including a wheelchair): A little  Standing up from a chair using your arms (e.g. wheelchair or bedside chair): A little  To walk in hospital room: A little  Climbing 3-5 steps with railing: A little  Basic Mobility - Total Score: 19    Education Documentation  Precautions, taught by Sindy Zavala PTA at 1/5/2024 10:36 AM.  Learner: Family, Patient  Readiness: Eager  Method: Explanation, Demonstration  Response: Verbalizes Understanding, Demonstrated Understanding    Education Comments  No comments found.        OP EDUCATION:       Encounter Problems       Encounter Problems (Active)       Mobility       indep in bed mobility  (Progressing)       Start:  01/04/24    Expected End:  01/06/24            amb (Mod I) with FWW, x 150 ft (Progressing)       Start:  01/04/24    Expected End:  01/06/24            sit<>stand (Mod I) with FWW  (Progressing)       Start:  01/04/24    Expected End:  01/06/24               Pain - Adult          Safety          Transfers       STG - Patient will follow hip precautions during transfers, with no needed verbal cues  (Progressing)       Start:  01/04/24    Expected End:  01/06/24            negotiate stairs x 3, no  handrail, with crutches  (Progressing)       Start:  01/04/24    Expected End:  01/06/24                 GABRIEL Muhammad

## 2024-01-05 NOTE — CARE PLAN
Problem: Mobility  Goal: indep in bed mobility   Outcome: Progressing  Goal: amb (Mod I) with FWW, x 150 ft  Outcome: Progressing  Goal: sit<>stand (Mod I) with FWW   Outcome: Progressing     Problem: Transfers  Goal: STG - Patient will follow hip precautions during transfers, with no needed verbal cues   Outcome: Progressing  Goal: negotiate stairs x 3, no  handrail, with crutches   Outcome: Progressing

## 2024-01-05 NOTE — DISCHARGE SUMMARY
Discharge Diagnosis  Primary osteoarthritis of left hip    Issues Requiring Follow-Up  Post op visit L THR    Test Results Pending At Discharge  Pending Labs       No current pending labs.            Hospital Course  43 year-old F who presented with L MELVINA. Patient is now s/p L MELVINA on 1/4 by Dr. Jefferson. On the day of surgery, patient was identified in the pre-operative holding area and agreeable to proceed with surgery. Written consent was obtained.  Please see operative note for further details of this procedure. Patient received 24 hours of bowen-operative antibiotics. Patient recovered in the PACU before transfer to a regular nursing floor. Patient was started on multimodal pain control and ASA 81 mg bid for DVT prophylaxis. Physical therapy recommended continued recovery at home with continued physical therapy and wound care. On the day of discharge, patient was afebrile with stable vital signs. Patient was neurovascularly intact at time of discharge. Patient was discharged with prescription of ASA 81 mg bid for DVT prophylaxis for 4 weeks. Patient will follow-up with Dr. Jefferson in two weeks for post-operative visit.     Pertinent Physical Exam At Time of Discharge  Physical Exam  Left Lower Extremity:   -Skin intact  -dressing is c/d/i  -Tender at site of injury with painful ROM.  -Fires DF/PF/EHL/FHL  -SILT in saph/sural/SPN/DPN distributions  -Foot warm, well perfused  -Palpable DP pulse, brisk cap refill  -Compartments soft and compressible    Home Medications     Medication List      START taking these medications     aspirin 81 mg EC tablet; Take 1 tablet (81 mg) by mouth 2 times a day.   cyclobenzaprine 10 mg tablet; Commonly known as: Flexeril; Take 1 tablet   (10 mg) by mouth 3 times a day as needed for muscle spasms for up to 10   days.   docusate sodium 100 mg capsule; Commonly known as: Colace; Take 1   capsule (100 mg) by mouth 2 times a day for 7 days.   meloxicam 15 mg tablet; Commonly known as:  Mobic; Take 1 tablet (15 mg)   by mouth once daily.   oxyCODONE 5 mg immediate release tablet; Commonly known as: Roxicodone;   Take 1 tablet (5 mg) by mouth every 6 hours for 7 days.   pantoprazole 40 mg EC tablet; Commonly known as: ProtoNix; Take 1 tablet   (40 mg) by mouth once daily in the morning. Take before meals. Do not   crush, chew, or split.   polyethylene glycol 17 gram packet; Commonly known as: Miralax; Take 17   g by mouth once daily for 10 days.   traMADol 50 mg tablet; Commonly known as: Ultram; Take 1 tablet (50 mg)   by mouth every 6 hours if needed for severe pain (7 - 10).     CHANGE how you take these medications     acetaminophen 325 mg tablet; Commonly known as: Tylenol; Take 2 tablets   (650 mg) by mouth every 6 hours.; What changed: medication strength, how   much to take, when to take this, reasons to take this; Notes to patient:   Last dose at 1130am      STOP taking these medications     chlorhexidine 0.12 % solution; Commonly known as: Peridex   chlorhexidine 4 % external liquid; Commonly known as: Hibiclens       Outpatient Follow-Up  Future Appointments   Date Time Provider Department Center   1/6/2024 To Be Determined Lucita Bay, PT [facility]   1/8/2024 To Be Determined Rick Smith OT [facility]   1/26/2024  9:30 AM Collette Mckenzie PA-C [id number] [employer]       Kobi Blair DO

## 2024-01-05 NOTE — PROGRESS NOTES
I met spoke with Angel at the bedside regarding discharge planning and home going needs. Patient lives at home with his significant other and he is usually independent with ADL's he does have a wheeled walker, elevated toilet seat and crutches in the home. Patient is recommended for low intensity therapy at discharge and he is processed for SOC Saturday 1/6/24 with [facility] services. I will continue to follow with a safe discharge plan.

## 2024-01-05 NOTE — PROGRESS NOTES
Acute Pain Service    Postop Pain HPI -   Palliative: relieved with IV analgesics and regional local anesthetics  Provocative: movement  Quality:  burning and aching  Radiation:  none  Severity:  3/10  Timing: constant    24-HOUR OPIOID CONSUMPTION:  Dilaudid 0.8 mg  Oxycodone 30 mg    Scheduled medications  acetaminophen, 650 mg, oral, q6h ELDER  aspirin, 81 mg, oral, BID  ketorolac, 30 mg, intravenous, q6h  pantoprazole, 40 mg, oral, Daily before breakfast  polyethylene glycol, 17 g, oral, Daily      Continuous medications  oxygen, 2 L/min, Last Rate: Stopped (01/04/24 1615)  sodium chloride 0.9%, 100 mL/hr, Last Rate: 100 mL/hr (01/04/24 2314)      PRN medications  PRN medications: cyclobenzaprine, diphenhydrAMINE, HYDROmorphone, naloxone, ondansetron **OR** ondansetron, oxyCODONE, oxyCODONE, oxyCODONE, traMADol     Physical Exam:  Constitutional:  no distress, alert and cooperative  Eyes: clear sclera  Head/Neck: No apparent injury, trachea midline  Respiratory/Thorax: Patent airways, thorax symmetric, breathing comfortably  Cardiovascular: no pitting edema  Gastrointestinal: Nondistended  Musculoskeletal: ROM intact  Extremities: no clubbing  Neurological: alert, valentine x4  Psychological: Appropriate affect    Results for orders placed or performed during the hospital encounter of 01/04/24 (from the past 24 hour(s))   CBC   Result Value Ref Range    WBC 10.9 4.4 - 11.3 x10*3/uL    nRBC 0.0 0.0 - 0.0 /100 WBCs    RBC 3.48 (L) 4.50 - 5.90 x10*6/uL    Hemoglobin 10.8 (L) 13.5 - 17.5 g/dL    Hematocrit 31.5 (L) 41.0 - 52.0 %    MCV 91 80 - 100 fL    MCH 31.0 26.0 - 34.0 pg    MCHC 34.3 32.0 - 36.0 g/dL    RDW 11.9 11.5 - 14.5 %    Platelets 192 150 - 450 x10*3/uL      Angel Chadwick is a 43 y.o. year old male patient who presents for left total hip arthroplasty with Dr. Jefferson on 1/4/24. Acute Pain consulted for block for postoperative pain control.      Plan:     - Left quadratus lumborum SS blocks performed  preoperatively on 1/4/24  - Pain medications per primary team  - Acute pain service will sign off     Acute Pain Team  pg [phone] ph [phone].

## 2024-01-05 NOTE — PROGRESS NOTES
Orthopaedic Surgery Progress Note    Subjective:  Patient resting comfortably in bed. No acute issues. Pain controlled. Denies N/V. Denies SOB/chest pain. Denies N/T.    Objective:  Vitals:    01/05/24 0000   BP: 122/75   Pulse: 83   Resp: 16   Temp: 37.2 °C (99 °F)   SpO2: 96%     Physical Exam    - Constitutional: No acute distress, cooperative  - Eyes: EOM grossly intact  - Head/Neck: Trachea midline  - Respiratory/Thorax: NWOB  - Cardiovascular: RRR on peripheral palpation  - Gastrointestinal: Nondistended  - Psychological: Appropriate mood/behavior  - Skin: Warm and dry. Additional findings in musculoskeletal evaluation  - Musculoskeletal:  ---  Left lower extremity:  - Dressing CDI  - Compartments soft and compressible  - Fires TA/GS/EHL  - SILT in Bella/Sa/SP/DP/T distribution  - Palpable DP pulse    No results found for this or any previous visit (from the past 24 hour(s)).    XR pelvis 1-2 views         XR pelvis 1-2 views   Final Result   Left total hip arthroplasty demonstrates anatomic alignment on this   frontal only view.   Signed by Keenan Obando MD      XR pelvis 1-2 views   Final Result      XR pelvis 1-2 views   Final Result   Ongoing left hip arthroplasty without osseous injury evident.        MACRO:   None        Signed by: Esau Isaac 1/4/2024 12:59 PM   Dictation workstation:   [id number]      FL less than 1 hour    (Results Pending)     Assessment:  43M s/p left MELVINA w Dr. Jefferson on 1/4. Patient recovering well in postoperative period.    Plan:  - Weight-bearing status: WBAT  - Feeding: Regular diet as tolerated, bowel regimen  - Analgesia: Multimodal  - Volume: mIV @ at  cc/hr, HLIVF when tolerating PO, monitor BMP  - OT/PT: Consulted  - Respiratory: Encourage IS, maintain O2 sat >92%  - Infection: Chelle-operative Ancef for 24 hours, afebrile   - Lines: Maintain PIVx2 while inpatient  - Drains: None  - Baca: None  - Embolic ppx: SCDs, ASA 81mg BID POD1  - Transfusion: Trend CBC, no  indication for transfusion  - Cardiac: No issues  - Glycemic: No issues  - C/w home medications  - Plan for dispo today with HCC  - Patient will follow up with Dr. Jefferson in clinic in two weeks    D/w Dr. Jefferson    This patient will be followed by the Orthopaedic Trauma service. Please page or Epic Chat the corresponding residents below with questions or concerns.     Ortho Trauma Service (Epic Chat Preferred)  First call: Frantz Lopez MD PGY1  First call: Reinaldo Hoyt MD PGY1  Second call: Carolina Hankins DO PGY2  Third call: Kobi Blair DO PGY3    6pm-6am M-F, Holidays, and weekends page Ortho on-call [phone] with urgent questions/concerns.     Frantz Lopez MD  Orthopaedic Surgery PGY-1  On-call pager [phone]

## 2024-01-05 NOTE — PROGRESS NOTES
Physical Therapy    Physical Therapy Treatment    Patient Name: Angel Chadwick  MRN: [id number]  Today's Date: 1/5/2024  Time Calculation  Start Time: 1320  Stop Time: 1332  Time Calculation (min): 12 min       Assessment/Plan   PT Assessment  PT Assessment Results: Decreased strength, Decreased endurance, Impaired balance, Decreased mobility  Rehab Prognosis: Excellent  Evaluation/Treatment Tolerance: Patient tolerated treatment well  Medical Staff Made Aware: Yes  End of Session Communication: Bedside nurse  Assessment Comment: Pt demos good functional mobility, no PT concerns prior to D/C to home with low intensity therapy and fmaily assist  End of Session Patient Position: Bed, 2 rail up, Alarm off, not on at start of session     PT Plan  Treatment/Interventions: Bed mobility, Transfer training, Gait training, Stair training, Balance training, Endurance training, Strengthening  PT Plan: Skilled PT  PT Frequency: BID  PT Discharge Recommendations: Low intensity level of continued care  PT Recommended Transfer Status: Assist x1  PT - OK to Discharge: Yes (pending medical clearance and ability to negotiate stairs to enter home.)      General Visit Information:   PT  Visit  PT Received On: 01/05/24  Response to Previous Treatment: Patient with no complaints from previous session.  General  Family/Caregiver Present: Yes  Caregiver Feedback: significant other  Prior to Session Communication: Bedside nurse  Patient Position Received: Bed, 2 rail up, Alarm off, not on at start of session  General Comment: Pt supine inbed, agreeable to therapy.    Subjective   Precautions:  Precautions  LE Weight Bearing Status: Weight Bearing as Tolerated  Post-Surgical Precautions: Left hip precautions  Precautions Comment: Posterior hip precautions- pt offers good recall and compliance throughout session.  Vital Signs:       Objective   Pain:  Pain Assessment  Pain Assessment: 0-10  Pain Score: 5 - Moderate pain  Pain Type: Surgical  "pain  Pain Location: Hip  Pain Orientation: Left  Pain Interventions: Distraction, Ambulation/increased activity    Treatments:    Bed Mobility  Bed Mobility: Yes  Bed Mobility 1  Bed Mobility 1: Supine to sitting, Sitting to supine  Level of Assistance 1: Distant supervision    Ambulation/Gait Training  Ambulation/Gait Training Performed: Yes  Ambulation/Gait Training 1  Surface 1: Level tile  Device 1: Rolling walker  Assistance 1: Close supervision  Quality of Gait 1:  (significantly limited WB L LE)  Comments/Distance (ft) 1: 100'x1  Ambulation/Gait Training 2  Surface 2: Level tile  Device 2: Axillary crutches  Assistance 2: Distant supervision  Comments/Distance (ft) 2: 100'x1  Transfers  Transfer: Yes  Transfer 1  Transfer From 1: Sit to, Stand to  Transfer to 1: Sit  Technique 1: Sit to stand, Stand to sit  Transfer Device 1:  (crutches vs 2WW)  Transfer Level of Assistance 1: Distant supervision  Trials/Comments 1: from varied surfaces  Transfers 2  Trials/Comments 2: Pt and wife verbally educated on safe car transfer technique.    Stairs  Stairs: Yes  Stairs  Rails 1: None (Comment)  Device 1: Axillary crutches  Support Devices 1: Gait belt  Assistance 1: Close supervision  Comment/Number of Steps 1: 4 (6\") steps, pt's significant other present to observe, reports no concerns with pt ability to negotiate.  Stairs 2  Rails 2: None (Comment)  Curb Step 2: Yes  Device 2: Axillary crutches  Support Devices 2: Gait belt  Assistance 2: Close supervision  Comment/Number of Steps 2: up/down 4\" curb step 2x with CGA, min cues for sequence and to avoid NWB L LE with descent to improve safety, balance and control.    Outcome Measures:     [facility] Basic Mobility  Turning from your back to your side while in a flat bed without using bedrails: None  Moving from lying on your back to sitting on the side of a flat bed without using bedrails: A little  Moving to and from bed to chair (including a wheelchair): A " little  Standing up from a chair using your arms (e.g. wheelchair or bedside chair): A little  To walk in hospital room: A little  Climbing 3-5 steps with railing: A little  Basic Mobility - Total Score: 19    Education Documentation  Precautions, taught by Sindy Zavala PTA at 1/5/2024  2:10 PM.  Learner: Patient  Readiness: Acceptance  Method: Explanation, Demonstration  Response: Verbalizes Understanding, Demonstrated Understanding    Precautions, taught by Sindy Zavala PTA at 1/5/2024 10:36 AM.  Learner: Family, Patient  Readiness: Eager  Method: Explanation, Demonstration  Response: Verbalizes Understanding, Demonstrated Understanding    Education Comments  No comments found.        OP EDUCATION:       Encounter Problems       Encounter Problems (Active)       Mobility       indep in bed mobility  (Progressing)       Start:  01/04/24    Expected End:  01/06/24            amb (Mod I) with FWW, x 150 ft (Progressing)       Start:  01/04/24    Expected End:  01/06/24            sit<>stand (Mod I) with FWW  (Progressing)       Start:  01/04/24    Expected End:  01/06/24               Pain - Adult          Safety          Transfers       STG - Patient will follow hip precautions during transfers, with no needed verbal cues  (Progressing)       Start:  01/04/24    Expected End:  01/06/24            negotiate stairs x 3, no  handrail, with crutches  (Progressing)       Start:  01/04/24    Expected End:  01/06/24                 GABRIEL Muhammad

## 2024-01-05 NOTE — PROGRESS NOTES
Occupational Therapy    Occupational Therapy    Evaluation    Patient Name: Angel Chadwick  MRN: [id number]  Today's Date: 1/5/2024  Time Calculation  Start Time: 1002  Stop Time: 1042  Time Calculation (min): 40 min    Assessment  IP OT Assessment  OT Assessment: New deficits with ADLs and IADLs resulting in a decreased in independence as compared to PLOF.  End of Session Communication: Bedside nurse  End of Session Patient Position: Bed, 2 rail up, Alarm off, not on at start of session  Plan:  Treatment Interventions: ADL retraining, Endurance training, Patient/family training, Equipment evaluation/education  OT Frequency: 3 times per week  OT Discharge Recommendations: Low intensity level of continued care  Equipment Recommended upon Discharge:  (Hip Kit (LHS, LHSH, elastic laces, reacher, sock aid))  OT Recommended Transfer Status: Stand by assist  OT - OK to Discharge: Yes    Subjective   Current Problem:  1. Osteoarthritis of left hip, unspecified osteoarthritis type  acetaminophen (Tylenol) 325 mg tablet    aspirin 81 mg EC tablet    cyclobenzaprine (Flexeril) 10 mg tablet    docusate sodium (Colace) 100 mg capsule    meloxicam (Mobic) 15 mg tablet    polyethylene glycol (Miralax) 17 gram packet    oxyCODONE (Roxicodone) 5 mg immediate release tablet    pantoprazole (ProtoNix) 40 mg EC tablet    traMADol (Ultram) 50 mg tablet    Referral to Home Health      2. Primary osteoarthritis of left hip          General:  Reason for Referral: s/p L THR 1/4/24  Past Medical History Relevant to Rehab: h/o Wnjj-Jsyqy-Yxmsdrf disease, L hip OA  Prior to Session Communication: Bedside nurse  Patient Position Received: Alarm off, not on at start of session, Bed, 2 rail up  Family/Caregiver Present: Yes  Caregiver Feedback: fiance  General Comment: Pt supine in bed, pleasant and agreeable to therapy   Precautions:  LE Weight Bearing Status: Weight Bearing as Tolerated  Post-Surgical Precautions: Left hip  precautions  Precautions Comment: Posterior hip precautions- pt offers good recall and compliance throughout session.  Vital Signs:     Pain:  Pain Assessment  Pain Assessment: 0-10  Pain Score: 3  Pain Type: Surgical pain  Pain Location: Hip  Pain Orientation: Left  Pain Descriptors: Sore, Dull  Pain Frequency: Constant/continuous  Patient's Stated Pain Goal: No pain  Pain Interventions: Distraction, Repositioned (ADL retraining)  Lines/Tubes/Drains:         Objective   Cognition:  Overall Cognitive Status: Within Functional Limits  Orientation Level: Oriented X4  Attention: Within Functional Limits  Memory: Within Funtional Limits  Problem Solving: Within Functional Limits  Safety/Judgement: Within Functional Limits  Insight: Within function limits  Cognition Test Scores  Cognition Tests: Cognition Test Performed (BCAM-)  Confusion Assessment Method (CAM)  Acute Onset and Fluctuating Course (1A): No     Home Living:  Type of Home: House  Lives With: Dependent children, Significant other  Home Adaptive Equipment: Walker rolling or standard, Crutches (raised toilet with handrail, reacher.)  Home Layout: Two level, Able to live on main level with bedroom/bathroom  Home Access: Stairs to enter without rails  Entrance Stairs-Rails: None  Entrance Stairs-Number of Steps: 3 to get in from garage  Bathroom Shower/Tub: Tub/shower unit, Door  Bathroom Toilet: Adaptive toilet seating, Standard (std toilet with riser and grab bars)  Bathroom Equipment: Grab bars around toilet  Home Living Comments: Pt lives with his fiance in a two level home with spouse and child.   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 at work enviroment,  will not be returning to same job.)  Leisure: anything outdoors, fishing, hiking, mountain climbing  Hand Dominance: Right  Prior Function Comments: PLOF pt independent in ADLs and IADLs  IADL History:  Homemaking Responsibilities: Yes (Pt share IADLs with fiance)  Current License: Yes  Mode of Transportation: Car  Occupation: Other (Comment) (on severance)  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  ADL:  Equipment: Reacher  Eating Assistance: Independent  Grooming Assistance: Stand by  Grooming Deficit: Setup  Bathing Assistance: Moderate (anticipated)  Bathing Deficit: Other (Comment) (Anterior MELVINA posterior precautions)  UE Dressing Assistance: Stand by  LE Dressing Assistance: Minimal  LE Dressing Deficit: Thread LLE into pants, Thread RLE into pants, Don/doff L sock, Don/doff R sock, Don/doff L shoe, Don/doff R shoe  Toileting Assistance with Device: Stand by  Toileting Deficit: Steadying  Functional Assistance: Moderate  Functional Deficit: Steadying  Activity Tolerance:  Endurance: Tolerates 10 - 20 min exercise with multiple rests  Balance:  Dynamic Sitting Balance  Dynamic Sitting-Balance Support: Bilateral upper extremity supported  Dynamic Sitting-Comments: Pt instructed on using reacher, dressing stick and sock aide during dynamic sitting in order to complete ADLs and IADLs while seated at EOB and maintaining posterior MELVINA precautions.  Dynamic Standing Balance  Dynamic Standing-Balance Support: Bilateral upper extremity supported  Dynamic Standing-Comments: FWW and reacher  Static Sitting Balance  Static Sitting-Balance Support: Bilateral upper extremity supported  Static Sitting-Level of Assistance: Close supervision  Static Standing Balance  Static Standing-Balance Support: Bilateral upper extremity supported  Static Standing-Level of Assistance: Close supervision  Static Standing-Comment/Number of Minutes: Pt competes standing level grooming at sink with Close supervision and verbal cues for  hand placement with FWW and to increase WB as tolerated.  Bed Mobility/Transfers: Bed Mobility  Bed Mobility: Yes  Bed Mobility 1  Bed Mobility 1: Supine to sitting, Sitting to supine  Level of Assistance 1: Close supervision   and Transfers  Transfer: Yes  Transfer 1  Transfer From 1: Sit to, Stand to  Transfer to 1: Sit  Technique 1: Sit to stand, Stand to sit  Transfer Level of Assistance 1: Close supervision  Transfers 2  Transfer From 2: Commode-standard to  Transfer to 2:  (FWW)  Technique 2: Sit to stand, Stand to sit  Transfer Level of Assistance 2: Close supervision  IADL's:   Homemaking Responsibilities: Yes (Pt share IADLs with fiance)  Current License: Yes  Mode of Transportation: Car  Occupation: Other (Comment) (on severance)  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  Vision: Vision - Basic Assessment  Current Vision: Other (Comment) (wears glasses at night for driving)   and    Sensation:  Sensation Comment: pt denies paresthesia  Strength:  Strength Comments: Grossly assessed 4+/5  Perception:  Inattention/Neglect: Appears intact  Initiation: Appears intact  Perseveration: Not present  Coordination:  Movements are Fluid and Coordinated: Yes (grossly assessed)   Hand Function:  Hand Function  Gross Grasp: Functional  Extremities: RUE   RUE : Within Functional Limits, LUE   LUE: Within Functional Limits, RLE   RLE :  (ROM WFL; MMT grossly 5/5 throughout), and LLE   LLE :  (ROM WFL; ankle DF 4-/5, knee ext 4-/5, knee flex 4-/5)    Outcome Measures: [facility] Daily Activity  Putting on and taking off regular lower body clothing: A lot  Bathing (including washing, rinsing, drying): A lot  Putting on and taking off regular upper body clothing: A little  Toileting, which includes using toilet, bedpan or urinal: A little  Taking care of personal grooming such as brushing teeth: A little  Eating Meals: None  Daily Activity - Total Score: 17  Brief Confusion Assessment Method (bCAM)  Feature 1: Altered  Mental Status or Fluctuating Course: No  CAM Result: CAM -      ,          Education Documentation  Handouts, taught by Fang Zhou OT at 1/5/2024 11:16 AM.  Learner: Patient  Readiness: Acceptance  Method: Explanation  Response: Verbalizes Understanding    Body Mechanics, taught by Fang Zhou OT at 1/5/2024 11:16 AM.  Learner: Patient  Readiness: Acceptance  Method: Explanation  Response: Verbalizes Understanding    Precautions, taught by Fang Zhou OT at 1/5/2024 11:16 AM.  Learner: Patient  Readiness: Acceptance  Method: Explanation  Response: Verbalizes Understanding    Home Exercise Program, taught by Fang Zhou OT at 1/5/2024 11:16 AM.  Learner: Patient  Readiness: Acceptance  Method: Explanation  Response: Verbalizes Understanding    ADL Training, taught by Fang Zhou OT at 1/5/2024 11:16 AM.  Learner: Patient  Readiness: Acceptance  Method: Explanation  Response: Verbalizes Understanding    Education Comments  No comments found.        Goals:   Encounter Problems       Encounter Problems (Active)       ADLs       Patient will perform LB bathing LHS  with modified independent level of assistance and long-handled sponge. (Progressing)       Start:  01/05/24    Expected End:  01/19/24            Patient with complete lower body dressing with modified independent level of assistance donning and doffing all LE clothes  with PRN adaptive equipment while edge of bed  (Progressing)       Start:  01/05/24    Expected End:  01/19/24               MOBILITY       Patient will perform Functional mobility Household distances modified independent level front wheeled walker in order to improve safety and functional mobility. (Progressing)       Start:  01/05/24    Expected End:  01/19/24                 TRANSFERS       Patient will complete functional transfer on/off ETS on toilet with modified independent level of assistance. (Progressing)       Start:  01/05/24    Expected End:  01/19/24                            Treatment Completed on Evaluation    01/05/24 at 11:22 AM   Fang Zhou OT   Rehab Office: [phone]

## 2024-01-05 NOTE — PROGRESS NOTES
Physical Therapy    Physical Therapy Evaluation & Treatment    Patient Name: Angel Chadwick  MRN: [id number]  Today's Date: 1/4/2024   Time Calculation  Start Time: 1835  Stop Time: 1920  Time Calculation (min): 45 min    Assessment/Plan   PT Assessment  PT Assessment Results: Decreased strength, Decreased range of motion, Decreased endurance, Impaired balance, Decreased mobility  Rehab Prognosis: Excellent  Evaluation/Treatment Tolerance: Patient tolerated treatment well  Medical Staff Made Aware: Yes  End of Session Communication: Bedside nurse  End of Session Patient Position: Bed, 3 rail up, Alarm on   IP OR SWING BED PT PLAN  Inpatient or Swing Bed: Inpatient  PT Plan  Treatment/Interventions: Bed mobility, Transfer training, Gait training, Stair training, Balance training, Endurance training, Strengthening  PT Plan: Skilled PT  PT Frequency: BID  PT Discharge Recommendations: Low intensity level of continued care  PT Recommended Transfer Status: Assist x1  PT - OK to Discharge: Yes (pending medical clearance and ability to negotiate stairs to enter home.)      Subjective     General Visit Information:  General  Reason for Referral: s/p L THR 1/4/24  Past Medical History Relevant to Rehab: h/o Ewiy-Bhpaw-Lovemgx disease, L hip OA  Family/Caregiver Present: Yes  Caregiver Feedback: wife  Prior to Session Communication: Bedside nurse  Patient Position Received: Bed, 3 rail up, Alarm on  General Comment: Pt supine in bed, pleasant and agreeable to PT eval. Returned later in evening to time pain meds with RN, as patient was in too much pain upon first PT attempt. Doing better at 2nd attempt, reporting 3/10 pain and wanting to get up.    Home Living:  Home Living  Type of Home: House  Lives With: Spouse, Dependent children  Home Adaptive Equipment: Walker rolling or standard, Crutches (raised toilet with handrail, reacher.)  Home Layout: Two level, Able to live on main level with bedroom/bathroom  Home Access: Stairs  to enter without rails  Entrance Stairs-Rails: None  Entrance Stairs-Number of Steps: 3+1  Home Living Comments: Pt lives with wife and 6 y/o daughter, 3 JOSE A from garage, no handrail. then another step up into main living area. bed/bath first level. has crutches at home, and would like to use them to get in/out of house on the stairs as he has used them in the past and is comfortable with them.    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 PT provided handout and reviewed precautions with both wife and patient throughout session. Pt able to properly return demo all prec during bed mobility, transfers, and ambulation.    Vital Signs:  Vital Signs  Heart Rate:  (PRE: 91; POST: 88)  SpO2:  (95% and greater throughout on room air)  BP:  (supine: 131/88, sitting /99)  BP Method: Automatic    Objective   Pain:  Pain Assessment  Pain Assessment: 0-10  Pain Score: 3  Pain Type: Surgical pain  Pain Location:  (L hip)    Cognition:  Cognition  Overall Cognitive Status: Within Functional Limits  Orientation Level: Oriented X4  Attention: Within Functional Limits  Memory: Within Funtional Limits    General Assessments:     Activity Tolerance  Endurance: Tolerates 10 - 20 min exercise with multiple rests  Early Mobility/Exercise Safety Screen: Proceed with mobilization - No exclusion  criteria met    Functional Assessments:  Bed Mobility  Bed Mobility:  (supine>sit with CGA, sit>sit with Min A to assist with L LE to bring over EOB.)    Transfers  Transfer:  (CGA for sit<>stand with FWW, CGA for standing at toilet to urinate ( did not need to sit on toilet during session).)    Ambulation/Gait Training  Ambulation/Gait Training Performed: Yes  Ambulation/Gait Training 1  Surface 1: Level tile  Device 1: Rolling walker  Gait Support Devices:  (pt requested to wear L knee brace for comfort)  Assistance 1: Contact guard  Quality of Gait 1: Inconsistent stride length, Decreased step length, Ataxic  Comments/Distance (ft) 1: 60 ft    Extremity/Trunk Assessments:  RLE   RLE :  (ROM WFL; MMT grossly 5/5 throughout)  LLE   LLE :  (ROM WFL; ankle DF 4-/5, knee ext 4-/5, knee flex 4-/5)    Treatments:  Therapeutic Exercise  Therapeutic Exercise Performed: Yes  Therapeutic Exercise Activity 1: ankle pumps x 10  Therapeutic Exercise Activity 2: quad sets x 10    Therapeutic Activity  Therapeutic Activity Performed: Yes  Therapeutic Activity 1: PT assisted pt in bathroom, CGA while pt stood at sink and washed hands, and ensured pt abided by all  hip prec.    Outcome Measures:  [facility] Basic Mobility  Turning from your back to your side while in a flat bed without using bedrails: None  Moving from lying on your back to sitting on the side of a flat bed without using bedrails: A little  Moving to and from bed to chair (including a wheelchair): A little  Standing up from a chair using your arms (e.g. wheelchair or bedside chair): A little  To walk in hospital room: A little  Climbing 3-5 steps with railing: A lot  Basic Mobility - Total Score: 18    Encounter Problems       Encounter Problems (Active)       Mobility       indep in bed mobility  (Progressing)       Start:  01/04/24    Expected End:  01/06/24            amb (Mod I) with FWW, x 150 ft (Progressing)       Start:  01/04/24    Expected End:  01/06/24             sit<>stand (Mod I) with FWW  (Progressing)       Start:  01/04/24    Expected End:  01/06/24                 Transfers       STG - Patient will follow hip precautions during transfers, with no needed verbal cues  (Progressing)       Start:  01/04/24    Expected End:  01/06/24            negotiate stairs x 3, no  handrail, with crutches  (Progressing)       Start:  01/04/24    Expected End:  01/06/24                   Education Documentation  Precautions, taught by Berta Celestin PT at 1/4/2024  7:50 PM.  Learner: Significant Other, Patient  Readiness: Acceptance  Method: Explanation, Demonstration  Response: Verbalizes Understanding, Demonstrated Understanding  Comment: reviewed all 3 hip precautions, and provided handout. Pt and wife able to verbalize understanding, and pt able to return demo hip prec during mobility this session.    Education Comments  No comments found.

## 2024-01-05 NOTE — PROGRESS NOTES
Met with Patient and Family at bedside- Patient is s/p Left Posterior Hip Replacement with Dr. Frantz Jefferson.  Discussion with patient included education on the following topics: [facility] Education: Wound Care, Post-Op Activity, Post-Op Precautions, Cold-Therapy, Importance of post-op prescriptions, When to call the Surgeon's Office, Use of MyChart, and When to call 9-1-1. Patient is able to verbalize understanding of class content/discussion.  Contact information was provided to patient for support and assistance during the post-operative period.

## 2024-01-05 NOTE — CARE PLAN
Problem: ADLs  Goal: Patient will perform LB bathing LHS  with modified independent level of assistance and long-handled sponge.  Outcome: Progressing  Goal: Patient with complete lower body dressing with modified independent level of assistance donning and doffing all LE clothes  with PRN adaptive equipment while edge of bed   Outcome: Progressing     Problem: TRANSFERS  Goal: Patient will complete functional transfer on/off ETS on toilet with modified independent level of assistance.  Outcome: Progressing     Problem: MOBILITY  Goal: Patient will perform Functional mobility Household distances modified independent level front wheeled walker in order to improve safety and functional mobility.  Outcome: Progressing

## 2024-01-05 NOTE — DISCHARGE SUMMARY
Discharge Diagnosis  Primary osteoarthritis of left hip    Issues Requiring Follow-Up  S/p MELVINA    Test Results Pending At Discharge  Pending Labs       No current pending labs.            Hospital Course  43 year-old F who presented with L MELVINA. Patient is now s/p L MELVINA on 1/4 by Dr. Jefferson. On the day of surgery, patient was identified in the pre-operative holding area and agreeable to proceed with surgery. Written consent was obtained.  Please see operative note for further details of this procedure. Patient received 24 hours of bowen-operative antibiotics. Patient recovered in the PACU before transfer to a regular nursing floor. Patient was started on multimodal pain control and ASA 81 mg bid for DVT prophylaxis. Physical therapy recommended continued recovery at home with continued physical therapy and wound care. On the day of discharge, patient was afebrile with stable vital signs. Patient was neurovascularly intact at time of discharge. Patient was discharged with prescription of ASA 81 mg bid for DVT prophylaxis for 4 weeks. Patient will follow-up with Dr. Jefferson in two weeks for post-operative visit.   Home Medications     Medication List      START taking these medications     aspirin 81 mg EC tablet; Take 1 tablet (81 mg) by mouth 2 times a day.   cyclobenzaprine 10 mg tablet; Commonly known as: Flexeril; Take 1 tablet   (10 mg) by mouth 3 times a day as needed for muscle spasms for up to 10   days.   docusate sodium 100 mg capsule; Commonly known as: Colace; Take 1   capsule (100 mg) by mouth 2 times a day for 7 days.   meloxicam 15 mg tablet; Commonly known as: Mobic; Take 1 tablet (15 mg)   by mouth once daily.   oxyCODONE 5 mg immediate release tablet; Commonly known as: Roxicodone;   Take 1 tablet (5 mg) by mouth every 6 hours for 7 days.   pantoprazole 40 mg EC tablet; Commonly known as: ProtoNix; Take 1 tablet   (40 mg) by mouth once daily in the morning. Take before meals. Do not   crush, chew, or  split.   polyethylene glycol 17 gram packet; Commonly known as: Miralax; Take 17   g by mouth once daily for 10 days.   traMADol 50 mg tablet; Commonly known as: Ultram; Take 1 tablet (50 mg)   by mouth every 6 hours if needed for severe pain (7 - 10).     CHANGE how you take these medications     acetaminophen 325 mg tablet; Commonly known as: Tylenol; Take 2 tablets   (650 mg) by mouth every 6 hours.; What changed: medication strength, how   much to take, when to take this, reasons to take this; Notes to patient:   Last dose at 1130am      STOP taking these medications     chlorhexidine 0.12 % solution; Commonly known as: Peridex   chlorhexidine 4 % external liquid; Commonly known as: Hibiclens       Outpatient Follow-Up  Future Appointments   Date Time Provider Department Center   1/6/2024 To Be Determined Lucita Bay, PT [facility]   1/8/2024 To Be Determined Rick Smith, OT [facility]   1/26/2024  9:30 AM Collette Mckenzie PA-C [id number]       Collette Mckenzie PA-C

## 2024-01-06 ENCOUNTER — HOME CARE VISIT (OUTPATIENT)
Dept: HOME HEALTH SERVICES | Facility: HOME HEALTH | Age: 44
End: 2024-01-06
Payer: COMMERCIAL

## 2024-01-06 VITALS
SYSTOLIC BLOOD PRESSURE: 138 MMHG | DIASTOLIC BLOOD PRESSURE: 80 MMHG | OXYGEN SATURATION: 96 % | HEART RATE: 96 BPM | TEMPERATURE: 98.8 F

## 2024-01-06 PROCEDURE — G0151 HHCP-SERV OF PT,EA 15 MIN: HCPCS

## 2024-01-06 PROCEDURE — 0023 HH SOC

## 2024-01-06 SDOH — HEALTH STABILITY: PHYSICAL HEALTH: PHYSICAL EXERCISE: 15

## 2024-01-06 SDOH — HEALTH STABILITY: PHYSICAL HEALTH: EXERCISE ACTIVITIES SETS: 1

## 2024-01-06 SDOH — HEALTH STABILITY: PHYSICAL HEALTH: EXERCISE ACTIVITY: HEEL SLIDES

## 2024-01-06 SDOH — HEALTH STABILITY: PHYSICAL HEALTH: PHYSICAL EXERCISE: SUPINE

## 2024-01-06 SDOH — HEALTH STABILITY: PHYSICAL HEALTH: EXERCISE ACTIVITY: ANKLE PUMPS

## 2024-01-06 SDOH — HEALTH STABILITY: PHYSICAL HEALTH: EXERCISE TYPE: LE EXERCISES

## 2024-01-06 SDOH — HEALTH STABILITY: PHYSICAL HEALTH: EXERCISE ACTIVITY: QUAD SETS

## 2024-01-06 SDOH — HEALTH STABILITY: PHYSICAL HEALTH: EXERCISE ACTIVITY: GLUT SETS

## 2024-01-06 SDOH — HEALTH STABILITY: PHYSICAL HEALTH: EXERCISE ACTIVITY: HIP ABD/ADDUCTION SLIDES

## 2024-01-06 ASSESSMENT — GAIT ASSESSMENTS
TRUNK SCORE: 0
INITIATION OF GAIT IMMEDIATELY AFTER GO: 1 - NO HESITANCY
PATH: 0 - MARKED DEVIATION
PATH SCORE: 0
GAIT SCORE: 8
WALKING STANCE: 1 - HEELS ALMOST TOUCHING WHILE WALKING
STEP CONTINUITY: 1 - STEPS APPEAR CONTINUOUS
TRUNK: 0 - MARKED SWAY OR USES WALKING AID
BALANCE AND GAIT SCORE: 20
STEP SYMMETRY: 1 - RIGHT AND LEFT STEP LENGTH APPEAR EQUAL

## 2024-01-06 ASSESSMENT — BALANCE ASSESSMENTS
ARISING SCORE: 1
SITTING DOWN: 1 - USES ARMS OR NOT SMOOTH MOTION
NUDGED SCORE: 2
IMMEDIATE STANDING BALANCE FIRST 5 SECONDS: 2 - STEADY WITHOUT WALKER OR OTHER SUPPORT
EYES CLOSED AT MAXIMUM POSITION NUDGED: 1 - STEADY
TURNING 360 DEGREES STEPS: 0 - DISCONTINUOUS STEPS
NUDGED: 2 - STEADY
ATTEMPTS TO ARISE: 2 - ABLE TO RISE, ONE ATTEMPT
STANDING BALANCE: 1 - STEADY BUT WIDE STANCE AND USES CANE OR OTHER SUPPORT
BALANCE SCORE: 12
SITTING BALANCE: 1 - STEADY, SAFE
ARISES: 1 - ABLE, USES ARMS TO HELP

## 2024-01-06 ASSESSMENT — ENCOUNTER SYMPTOMS
LOWEST PAIN SEVERITY IN PAST 24 HOURS: 4/10
OCCASIONAL FEELINGS OF UNSTEADINESS: 1
PAIN LOCATION - RELIEVING FACTORS: PAIN MEDS, ICE, REST
PAIN LOCATION: LEFT HIP
PAIN LOCATION - PAIN FREQUENCY: CONSTANT
PAIN: 1
PAIN LOCATION - EXACERBATING FACTORS: MOVEMENT
SUBJECTIVE PAIN PROGRESSION: GRADUALLY WORSENING
PAIN LOCATION - PAIN SEVERITY: 4/10
HIGHEST PAIN SEVERITY IN PAST 24 HOURS: 8/10
PAIN SEVERITY GOAL: 0/10
PERSON REPORTING PAIN: PATIENT
MUSCLE WEAKNESS: 1

## 2024-01-06 ASSESSMENT — ACTIVITIES OF DAILY LIVING (ADL)
ENTERING_EXITING_HOME: STAND BY ASSIST
AMBULATION ASSISTANCE: STAND BY ASSIST
CURRENT_FUNCTION: STAND BY ASSIST
AMBULATION_DISTANCE/DURATION_TOLERATED: 50 FT X 2
OASIS_M1830: 03
AMBULATION ASSISTANCE ON FLAT SURFACES: 1

## 2024-01-08 ENCOUNTER — HOME CARE VISIT (OUTPATIENT)
Dept: HOME HEALTH SERVICES | Facility: HOME HEALTH | Age: 44
End: 2024-01-08
Payer: COMMERCIAL

## 2024-01-08 PROCEDURE — G0152 HHCP-SERV OF OT,EA 15 MIN: HCPCS

## 2024-01-08 ASSESSMENT — ENCOUNTER SYMPTOMS
PAIN SEVERITY GOAL: 0/10
PAIN LOCATION - PAIN QUALITY: ACHE
SUBJECTIVE PAIN PROGRESSION: GRADUALLY IMPROVING
PAIN LOCATION - RELIEVING FACTORS: MEDS REST ICE
LOWEST PAIN SEVERITY IN PAST 24 HOURS: 4/10
HIGHEST PAIN SEVERITY IN PAST 24 HOURS: 7/10
PAIN LOCATION - PAIN SEVERITY: 4/10
PAIN: 1
PAIN LOCATION: LEFT HIP
PERSON REPORTING PAIN: PATIENT

## 2024-01-08 ASSESSMENT — ACTIVITIES OF DAILY LIVING (ADL)
BATHING_CURRENT_FUNCTION: INDEPENDENT
BATHING ASSESSED: 1
DRESSING_LB_CURRENT_FUNCTION: INDEPENDENT

## 2024-01-09 ENCOUNTER — HOME CARE VISIT (OUTPATIENT)
Dept: HOME HEALTH SERVICES | Facility: HOME HEALTH | Age: 44
End: 2024-01-09
Payer: COMMERCIAL

## 2024-01-09 PROCEDURE — G0157 HHC PT ASSISTANT EA 15: HCPCS

## 2024-01-09 SDOH — HEALTH STABILITY: PHYSICAL HEALTH: EXERCISE TYPE: THR

## 2024-01-09 SDOH — HEALTH STABILITY: PHYSICAL HEALTH
EXERCISE COMMENTS: PT COMPLETED X 20 EA:    SUPINE:     ANKLE PUMPS     GS     QS     HIP ABD/ADD     HEEL SLIDES  SEATED:     LAQ

## 2024-01-09 ASSESSMENT — ENCOUNTER SYMPTOMS
MUSCLE WEAKNESS: 1
PAIN SEVERITY GOAL: 0/10
PAIN LOCATION - EXACERBATING FACTORS: EXTENDED ACTIVITY
PAIN LOCATION: LEFT HIP
PAIN LOCATION - PAIN SEVERITY: 3/10
HIGHEST PAIN SEVERITY IN PAST 24 HOURS: 5/10
PERSON REPORTING PAIN: PATIENT
PAIN LOCATION - PAIN QUALITY: DULL/ACHEY
LOWEST PAIN SEVERITY IN PAST 24 HOURS: 3/10
PAIN: 1
SUBJECTIVE PAIN PROGRESSION: WAXING AND WANING
LIMITED RANGE OF MOTION: 1
PAIN LOCATION - PAIN FREQUENCY: CONSTANT

## 2024-01-09 ASSESSMENT — ACTIVITIES OF DAILY LIVING (ADL)
AMBULATION ASSISTANCE: SUPERVISION
PHYSICAL TRANSFERS ASSESSED: 1
AMBULATION_DISTANCE/DURATION_TOLERATED: X~
AMBULATION ASSISTANCE ON FLAT SURFACES: 1
AMBULATION ASSISTANCE: 1
CURRENT_FUNCTION: SUPERVISION

## 2024-01-11 ENCOUNTER — HOME CARE VISIT (OUTPATIENT)
Dept: HOME HEALTH SERVICES | Facility: HOME HEALTH | Age: 44
End: 2024-01-11
Payer: COMMERCIAL

## 2024-01-11 PROCEDURE — G0157 HHC PT ASSISTANT EA 15: HCPCS

## 2024-01-11 SDOH — HEALTH STABILITY: PHYSICAL HEALTH
EXERCISE COMMENTS: SUPINE:     ANKLE PUMPS     GS     QS     HIP ABD/ADD     HEEL SLIDES  SEATED:     LAQS  STANDING:     MARCHING IN PLACE: LESS THAN 90 DEGREES     HIP EXT     HIP ABD     HS CURLS     TOE RAISES     HEEL RAISES

## 2024-01-11 SDOH — HEALTH STABILITY: PHYSICAL HEALTH: EXERCISE TYPE: THR

## 2024-01-11 ASSESSMENT — ENCOUNTER SYMPTOMS
HIGHEST PAIN SEVERITY IN PAST 24 HOURS: 5/10
PAIN SEVERITY GOAL: 0/10
LOWEST PAIN SEVERITY IN PAST 24 HOURS: 3/10
PAIN: 1
PERSON REPORTING PAIN: PATIENT
PAIN LOCATION - EXACERBATING FACTORS: EXTENDED ACTIVITY
PAIN LOCATION - PAIN FREQUENCY: CONSTANT
PAIN LOCATION: LEFT HIP
PAIN LOCATION - PAIN SEVERITY: 3/10
PAIN LOCATION - PAIN QUALITY: DULL/ACHEY
MUSCLE WEAKNESS: 1
SUBJECTIVE PAIN PROGRESSION: WAXING AND WANING

## 2024-01-11 ASSESSMENT — ACTIVITIES OF DAILY LIVING (ADL)
PHYSICAL TRANSFERS ASSESSED: 1
AMBULATION ASSISTANCE: 1
AMBULATION ASSISTANCE: SUPERVISION
AMBULATION ASSISTANCE ON FLAT SURFACES: 1
AMBULATION_DISTANCE/DURATION_TOLERATED: X~
CURRENT_FUNCTION: SUPERVISION

## 2024-01-15 ENCOUNTER — HOME CARE VISIT (OUTPATIENT)
Dept: HOME HEALTH SERVICES | Facility: HOME HEALTH | Age: 44
End: 2024-01-15
Payer: COMMERCIAL

## 2024-01-15 PROCEDURE — G0157 HHC PT ASSISTANT EA 15: HCPCS

## 2024-01-15 SDOH — HEALTH STABILITY: PHYSICAL HEALTH
EXERCISE COMMENTS: THR COMPLETED X 20EA:   SUPINE:   ANKLE PUMPS   GS   QS   HIP ABD/ADD     SIDE STEPPING @ KITCHEN COUNTER  HEEL SLIDES   SEATED:   LAQS   STANDING:   MARCHING IN PLACE: LESS THAN 90 DEGREES   HIP EXT   HIP ABD   HS CURLS   TOE RAISES   HEEL RAISES

## 2024-01-15 SDOH — HEALTH STABILITY: PHYSICAL HEALTH: EXERCISE TYPE: THR

## 2024-01-15 ASSESSMENT — ENCOUNTER SYMPTOMS
PAIN SEVERITY GOAL: 0/10
PERSON REPORTING PAIN: PATIENT
PAIN LOCATION - RELIEVING FACTORS: ACTIVITY
PAIN LOCATION - PAIN SEVERITY: 5/10
PAIN: 1
PAIN LOCATION - PAIN FREQUENCY: CONSTANT
LOWEST PAIN SEVERITY IN PAST 24 HOURS: 4/10
PAIN LOCATION - PAIN QUALITY: ACHEY
LIMITED RANGE OF MOTION: 1
SUBJECTIVE PAIN PROGRESSION: UNCHANGED
PAIN LOCATION: LEFT KNEE
MUSCLE WEAKNESS: 1
HIGHEST PAIN SEVERITY IN PAST 24 HOURS: 6/10

## 2024-01-15 ASSESSMENT — ACTIVITIES OF DAILY LIVING (ADL)
AMBULATION ASSISTANCE: 1
AMBULATION ASSISTANCE: SUPERVISION
PHYSICAL TRANSFERS ASSESSED: 1
CURRENT_FUNCTION: SUPERVISION
AMBULATION ASSISTANCE ON FLAT SURFACES: 1

## 2024-01-17 ENCOUNTER — HOME CARE VISIT (OUTPATIENT)
Dept: HOME HEALTH SERVICES | Facility: HOME HEALTH | Age: 44
End: 2024-01-17
Payer: COMMERCIAL

## 2024-01-17 PROCEDURE — G0157 HHC PT ASSISTANT EA 15: HCPCS

## 2024-01-17 SDOH — HEALTH STABILITY: PHYSICAL HEALTH

## 2024-01-17 SDOH — HEALTH STABILITY: PHYSICAL HEALTH: EXERCISE TYPE: TKA

## 2024-01-17 ASSESSMENT — ACTIVITIES OF DAILY LIVING (ADL)
PHYSICAL TRANSFERS ASSESSED: 1
AMBULATION ASSISTANCE: INDEPENDENT
AMBULATION ASSISTANCE ON FLAT SURFACES: 1
AMBULATION_DISTANCE/DURATION_TOLERATED: X~150'
AMBULATION ASSISTANCE: 1
CURRENT_FUNCTION: SUPERVISION

## 2024-01-17 ASSESSMENT — ENCOUNTER SYMPTOMS
PAIN LOCATION - RELIEVING FACTORS: REST
LOWEST PAIN SEVERITY IN PAST 24 HOURS: 2/10
PAIN LOCATION: LEFT KNEE
PAIN: 1
MUSCLE WEAKNESS: 1
SUBJECTIVE PAIN PROGRESSION: WAXING AND WANING
PAIN LOCATION - PAIN QUALITY: DULL
PAIN LOCATION - PAIN SEVERITY: 2/10
PAIN SEVERITY GOAL: 0/10
PAIN LOCATION - PAIN FREQUENCY: CONSTANT
HIGHEST PAIN SEVERITY IN PAST 24 HOURS: 6/10
PERSON REPORTING PAIN: PATIENT

## 2024-01-22 ENCOUNTER — HOME CARE VISIT (OUTPATIENT)
Dept: HOME HEALTH SERVICES | Facility: HOME HEALTH | Age: 44
End: 2024-01-22
Payer: COMMERCIAL

## 2024-01-22 PROCEDURE — G0157 HHC PT ASSISTANT EA 15: HCPCS

## 2024-01-22 SDOH — HEALTH STABILITY: PHYSICAL HEALTH: EXERCISE TYPE: THR

## 2024-01-22 ASSESSMENT — ENCOUNTER SYMPTOMS
PAIN LOCATION - PAIN SEVERITY: 1/10
LIMITED RANGE OF MOTION: 1
HIGHEST PAIN SEVERITY IN PAST 24 HOURS: 3/10
PAIN SEVERITY GOAL: 0/10
LOWEST PAIN SEVERITY IN PAST 24 HOURS: 1/10
PAIN LOCATION - RELIEVING FACTORS: ICE/REST
SUBJECTIVE PAIN PROGRESSION: WAXING AND WANING
MUSCLE WEAKNESS: 1
PAIN: 1
PAIN LOCATION - PAIN QUALITY: ACHE
PERSON REPORTING PAIN: PATIENT
PAIN LOCATION: INCISION
PAIN LOCATION - EXACERBATING FACTORS: OVERUSE
PAIN LOCATION - PAIN FREQUENCY: CONSTANT

## 2024-01-22 ASSESSMENT — ACTIVITIES OF DAILY LIVING (ADL)
AMBULATION ASSISTANCE ON FLAT SURFACES: 1
PHYSICAL TRANSFERS ASSESSED: 1
CURRENT_FUNCTION: SUPERVISION
AMBULATION ASSISTANCE: 1
AMBULATION ASSISTANCE: SUPERVISION
AMBULATION_DISTANCE/DURATION_TOLERATED: X ~200'

## 2024-01-25 ENCOUNTER — HOME CARE VISIT (OUTPATIENT)
Dept: HOME HEALTH SERVICES | Facility: HOME HEALTH | Age: 44
End: 2024-01-25
Payer: COMMERCIAL

## 2024-01-25 VITALS — DIASTOLIC BLOOD PRESSURE: 85 MMHG | HEART RATE: 100 BPM | SYSTOLIC BLOOD PRESSURE: 122 MMHG | RESPIRATION RATE: 18 BRPM

## 2024-01-25 PROCEDURE — G0151 HHCP-SERV OF PT,EA 15 MIN: HCPCS

## 2024-01-25 SDOH — HEALTH STABILITY: PHYSICAL HEALTH: EXERCISE TYPE: HEP--STANDING

## 2024-01-25 ASSESSMENT — ENCOUNTER SYMPTOMS
DEPRESSION: 0
PAIN SEVERITY GOAL: 0/10
PAIN: 1
LOSS OF SENSATION IN FEET: 0
LOWEST PAIN SEVERITY IN PAST 24 HOURS: 0/10
PERSON REPORTING PAIN: PATIENT
HIGHEST PAIN SEVERITY IN PAST 24 HOURS: 0/10
OCCASIONAL FEELINGS OF UNSTEADINESS: 1

## 2024-01-25 ASSESSMENT — PAIN SCALES - PAIN ASSESSMENT IN ADVANCED DEMENTIA (PAINAD)
FACIALEXPRESSION: 0 - SMILING OR INEXPRESSIVE.
NEGVOCALIZATION: 0 - NONE.
BODYLANGUAGE: 0
CONSOLABILITY: 0 - NO NEED TO CONSOLE.
NEGVOCALIZATION: 0
BODYLANGUAGE: 0 - RELAXED.
FACIALEXPRESSION: 0
BREATHING: 0
CONSOLABILITY: 0
TOTALSCORE: 0

## 2024-01-25 ASSESSMENT — ACTIVITIES OF DAILY LIVING (ADL)
AMBULATION_DISTANCE/DURATION_TOLERATED: 500
OASIS_M1830: 00
ADLS_COMMENTS: WFL
AMBULATION ASSISTANCE ON FLAT SURFACES: 1
HOME_HEALTH_OASIS: 00

## 2024-01-26 ENCOUNTER — OFFICE VISIT (OUTPATIENT)
Dept: ORTHOPEDIC SURGERY | Facility: HOSPITAL | Age: 44
End: 2024-01-26
Payer: COMMERCIAL

## 2024-01-26 ENCOUNTER — HOSPITAL ENCOUNTER (OUTPATIENT)
Dept: RADIOLOGY | Facility: HOSPITAL | Age: 44
Discharge: HOME | End: 2024-01-26
Payer: COMMERCIAL

## 2024-01-26 VITALS — WEIGHT: 205 LBS | HEIGHT: 69 IN | BODY MASS INDEX: 30.36 KG/M2

## 2024-01-26 DIAGNOSIS — M16.12 ARTHRITIS OF LEFT HIP: Primary | ICD-10-CM

## 2024-01-26 DIAGNOSIS — M16.12 ARTHRITIS OF LEFT HIP: ICD-10-CM

## 2024-01-26 PROCEDURE — 73502 X-RAY EXAM HIP UNI 2-3 VIEWS: CPT | Mod: LEFT SIDE | Performed by: STUDENT IN AN ORGANIZED HEALTH CARE EDUCATION/TRAINING PROGRAM

## 2024-01-26 PROCEDURE — 1036F TOBACCO NON-USER: CPT | Performed by: PHYSICIAN ASSISTANT

## 2024-01-26 PROCEDURE — 73502 X-RAY EXAM HIP UNI 2-3 VIEWS: CPT | Mod: LT

## 2024-01-26 PROCEDURE — 99024 POSTOP FOLLOW-UP VISIT: CPT | Performed by: PHYSICIAN ASSISTANT

## 2024-01-26 ASSESSMENT — PAIN - FUNCTIONAL ASSESSMENT: PAIN_FUNCTIONAL_ASSESSMENT: NO/DENIES PAIN

## 2024-01-30 ENCOUNTER — EVALUATION (OUTPATIENT)
Dept: PHYSICAL THERAPY | Facility: CLINIC | Age: 44
End: 2024-01-30
Payer: COMMERCIAL

## 2024-01-30 DIAGNOSIS — M16.12 ARTHRITIS OF LEFT HIP: ICD-10-CM

## 2024-01-30 PROCEDURE — 97161 PT EVAL LOW COMPLEX 20 MIN: CPT | Mod: GP | Performed by: PHYSICAL THERAPIST

## 2024-01-30 ASSESSMENT — PAIN SCALES - GENERAL: PAINLEVEL_OUTOF10: 1

## 2024-01-30 ASSESSMENT — PAIN - FUNCTIONAL ASSESSMENT: PAIN_FUNCTIONAL_ASSESSMENT: 0-10

## 2024-01-30 ASSESSMENT — PATIENT HEALTH QUESTIONNAIRE - PHQ9
SUM OF ALL RESPONSES TO PHQ9 QUESTIONS 1 AND 2: 0
2. FEELING DOWN, DEPRESSED OR HOPELESS: NOT AT ALL
1. LITTLE INTEREST OR PLEASURE IN DOING THINGS: NOT AT ALL

## 2024-01-30 ASSESSMENT — ENCOUNTER SYMPTOMS
LOSS OF SENSATION IN FEET: 0
OCCASIONAL FEELINGS OF UNSTEADINESS: 0
DEPRESSION: 0

## 2024-01-30 NOTE — PROGRESS NOTES
Physical Therapy Evaluation and Treatment      Patient Name: Angel Chadwick  MRN: [id number]  Today's Date: 1/30/2024  Time Calculation  Start Time: 1135  Stop Time: 1205  Time Calculation (min): 30 min    Visit # 1/13    Assessment:  PT Assessment Results: Decreased strength, Decreased range of motion, Impaired balance, Decreased mobility, Pain, Orthopedic restrictions  Rehab Prognosis: Good  Barriers to Participation:  (None)    The pt presents with Medical Dx of arthritis of L hip, S/P L THR posterior approach.   Pt presents with the following deficits: increased pain, decreased strength, ROM, flexibility, balance, gait and functional mobility.  Pt would benefit from PT services in order to improve on these deficits and maximize strength and ability for functional activity/mobility.        Plan:  Treatment/Interventions: Cryotherapy, Education/ Instruction, Gait training, Manual therapy, Neuromuscular re-education, Therapeutic exercises (Manual Therapy including IASTM as needed)  PT Plan: Skilled PT  PT Frequency:  (2x/wk for 6 weeks or 13 sessions)  Rehab Potential: Good  Plan of Care Agreement: Patient (Patient Goal:  Improve strength and ability to walk and get around better.)    Current Problem:   Problem List Items Addressed This Visit             ICD-10-CM       Musculoskeletal and Injuries    Arthritis of left hip M16.12    Relevant Orders    Follow Up In Physical Therapy       Subjective   Pt is s/p L THR on 1/4/2024 and reports that his hip is doing well since surgery.  Pt reports that HH PT went well and that he feels more steady walking outdoors with use of cane.  Pt had a posterior approach and has posterior hip precautions.  The pt is to see MD for follow up on 3/8/2024.  Pt reports that too much standing or walking.  Pt reports that rest, ice and asprin as needed helps with the pain.      General  Reason for Referral: L hip sugery  Referred By: Collette Mckenzie PA-C  General Comment: Visit #      Precautions:  Precautions  Precautions Comment: Mild Fall Risk. PMH: S/P Arthroplasty Total Hip Posterior Approach 2024. WBAT with posterior hip precautions    Pain:  Pain Assessment  Pain Assessment: 0-10  Pain Score: 1 (L hip pain range  1-8/10)  Pain Type: Surgical pain  Pain Location: Hip  Pain Orientation: Left    Home Living:   Pt lives with wife in a 1 story home.  No concerns on living set up.      Prior Level of Function:   Pt was I with all ADL's and IADL's prior.  Works at [employer].        Objective   Observation:   Incision site is healing well without any drainage or signs of infection.     Gait:  Antalgic gait with slower evelia with use of cane.      Palpation:   Tender L hip greater trochanter and near incision site.      Strength:                      R-   Hip flex   5/5   Hip abd   5/5    Hip add  5/5  Knee flex   5/5   Knee ext   5/5     L-   Hip flex   4/5   Hip abd   4/5    Hip add  5/5  Knee flex   4+/5   Knee ext   4+/5    Hip PROM:                    R-   WNL Throughout.     L-   Hip flex  0-90 deg per hip precautions   Hip abd 30  deg    Hip ER  NT   Hip IR NT      Flexibility:  Moderate tightness L HS, hip adductors.                                                               Special Tests:   N/A    Outcome Measures:  Other Measures  Lower Extremity Funtional Score (LEFS): 40     Treatments:  No Rx Performed today.    OP EDUCATION:   PT edu pt regarding PT POC/course of treatment.  Pt verbalized good understanding.      Goals:  Active       PT Problem       PT Goal 1       Start:  24    Expected End:  24       LT) Improve L hip strength from 4/5  to >= 5-/5 throughout in order to facilitate safe gait and mobility.       4-6 weeks      2) Improve L hip PROM  to  0-105 deg flex and 15-20 deg ER/IR once total hip precautions are lifted in order to facilitate safe gait and stair negotiation.       4-6 weeks        3) Improve L hip pain from   7/10 to  <= 0-2/10 with activity in order to improve QOL.  4-6 weeks      4) Improve LEFS score by >= 5 points in order to improve QOL.    4-6 weeks      5) Pt will be able to walk longer distances, transition sit to stand from chair, negotiate stairs and return to exercise, or work around the home or on the job without significant limitation.    4-6 weeks      ST) Pt/caregiver will be I and consistent with HEP with use of handout as needed in order to maximize strength and flexibility.      2-3 weeks

## 2024-01-30 NOTE — PROGRESS NOTES
History of Present Illness   Angel Chadwick is a 43 y.o. male presenting today for post-op check from [facility] on 1/4/24. Overall doing ok. Has mild pain that is not requiring narcotics for pain control. Completed home therapy and ready to transition to outpatient. Incisions are well healing without redness or drainage. Compliant with WB recommendations. Denies numbness, tingling, f/c, CP, SOB or any other complaints or concerns.      Past Medical History:   Diagnosis Date    Abdominal pain     Arthritis     Chronic pain disorder     Gastroenteritis     Joint pain     Klpx-Wrbvi-Zsmosmj disease     Diagnosed at age 7    Varicose vein of leg        Medication Documentation Review Audit       Reviewed by Ambrose Selby on 01/26/24 at 0945      Medication Order Taking? Sig Documenting Provider Last Dose Status   acetaminophen (Tylenol) 325 mg tablet [id number]  Take 2 tablets (650 mg) by mouth every 6 hours. Collette Mckenzie PA-C  Active   aspirin 81 mg EC tablet [id number]  Take 1 tablet (81 mg) by mouth 2 times a day. Collette Mckenzie PA-C  Active    Patient not taking:   Discontinued 01/25/24 1219    Patient not taking:   Discontinued 01/25/24 1219   meloxicam (Mobic) 15 mg tablet [id number]  Take 1 tablet (15 mg) by mouth once daily. Collette Mckenzie PA-C  Active    Patient not taking:   Discontinued 01/25/24 1219   pantoprazole (ProtoNix) 40 mg EC tablet [id number]  Take 1 tablet (40 mg) by mouth once daily in the morning. Take before meals. Do not crush, chew, or split. Collette Mckenzie PA-C  Active    Patient not taking:   Discontinued 01/25/24 1219   traMADol (Ultram) 50 mg tablet [id number]  Take 1 tablet (50 mg) by mouth every 6 hours if needed for severe pain (7 - 10). Collette Mckenzie PA-C  Active   traMADol (Ultram) 50 mg tablet [id number]  Take 1 tablet (50 mg) by mouth every 6 hours if needed for severe pain (7 - 10). Collette Mckenzie PA-C  Active                    No Known Allergies    Social History      Socioeconomic History    Marital status: Single     Spouse name: Not on file    Number of children: Not on file    Years of education: Not on file    Highest education level: Not on file   Occupational History    Not on file   Tobacco Use    Smoking status: Never    Smokeless tobacco: Never   Vaping Use    Vaping Use: Never used   Substance and Sexual Activity    Alcohol use: Yes     Comment: socially    Drug use: Not Currently     Types: Marijuana    Sexual activity: Defer   Other Topics Concern    Not on file   Social History Narrative    Not on file     Social Determinants of Health     Financial Resource Strain: Low Risk  (1/4/2024)    Overall Financial Resource Strain (CARDIA)     Difficulty of Paying Living Expenses: Not very hard   Food Insecurity: Not on file   Transportation Needs: No Transportation Needs (1/25/2024)    OASIS : Transportation     Lack of Transportation (Medical): No     Lack of Transportation (Non-Medical): No     Patient Unable or Declines to Respond: No   Physical Activity: Not on file   Stress: Not on file   Social Connections: Feeling Socially Integrated (1/25/2024)    OASIS : Social Isolation     Frequency of experiencing loneliness or isolation: Never   Intimate Partner Violence: Not on file   Housing Stability: Low Risk  (1/4/2024)    Housing Stability Vital Sign     Unable to Pay for Housing in the Last Year: No     Number of Places Lived in the Last Year: 1     Unstable Housing in the Last Year: No       Past Surgical History:   Procedure Laterality Date    OTHER SURGICAL HISTORY  02/23/2022    No history of surgery    WISDOM TOOTH EXTRACTION      age 18          Review of Systems:  30 point ROS reviewed and negative other than as listed in the HPI     Physical Exam:  Gen: The pt is A&Ox3, NAD, and appear state age and weight  Psychiatric: mood and affect are appropriate   Eyes: sclera are white, EOM grossly intact  ENT: MMM  Neck: supple, thyroid is  midline  Respiratory: respirations are nonlabored, chest rise symmetric  CV: rate is regular by palpation of distal pulses  Abdomen: nondistended   Integument: no obvious cutaneous lesions noted. No signs of lymphangitis. No signs of systemic edema.   MSK: left hip surgical incision well healing without edema, erythema, drainage, or other s/sx of infection. Appropriately tender to palpation around the incision. SILT throughout the leg intact. Intact plantarflexion and dorsiflexion. Foot warm and well perfused.      Imaging:  I personally reviewed multiple views of the  L hip  were obtained in the office today demonstrate stable position of the hardware.      Assessment   43 y.o. male post-op from [facility] on 1/4/24.     Plan:  Continue WBAT on  LLE with posterior hip precautions  Incisions well healing; wound care instructed  Continue DVT ppx and vit d/calcium for bone health  Referral for outpatient PT provided     Follow-up 2 months with 2 views left hip (ap and cross table lateral) with WB AP pelvis.     All of the patient's questions/concerns address and they are in agreement with the plan.

## 2024-02-02 ENCOUNTER — TREATMENT (OUTPATIENT)
Dept: PHYSICAL THERAPY | Facility: CLINIC | Age: 44
End: 2024-02-02
Payer: COMMERCIAL

## 2024-02-02 DIAGNOSIS — M16.12 ARTHRITIS OF LEFT HIP: ICD-10-CM

## 2024-02-02 PROCEDURE — 97110 THERAPEUTIC EXERCISES: CPT | Mod: GP,CQ

## 2024-02-02 ASSESSMENT — PAIN - FUNCTIONAL ASSESSMENT: PAIN_FUNCTIONAL_ASSESSMENT: 0-10

## 2024-02-02 ASSESSMENT — PAIN SCALES - GENERAL: PAINLEVEL_OUTOF10: 0 - NO PAIN

## 2024-02-02 NOTE — PROGRESS NOTES
Physical Therapy Treatment    Patient Name: Angel Chadwick  MRN: [id number]  Today's Date: 2/2/2024  Time Calculation  Start Time: 1445  Stop Time: 1530  Time Calculation (min): 45 min      Assessment:   Patient identified by name and date of birth. Patient was able to demonstrated good tolerance with progression of strengthening this date. He demonstrated good understanding of HEP with progression and handouts. He demonstrated mild muscle fatigue with SLR.       Plan:  OP PT Plan  Treatment/Interventions: Cryotherapy, Education/ Instruction, Gait training, Manual therapy, Neuromuscular re-education, Therapeutic exercises (Manual Therapy including IASTM as needed)  PT Plan: Skilled PT  PT Frequency:  (2x/wk for 6 weeks or 13 sessions)  Rehab Potential: Good  Plan of Care Agreement: Patient (Patient Goal:  Improve strength and ability to walk and get around better.)  Continue with strengthening and balance activities for increased ease with ambulation.   Current Problem  Problem List Items Addressed This Visit             ICD-10-CM    Arthritis of left hip M16.12       Subjective Patient verbalized good understanding of HEP that was provided this date. He reported 0/10 pain after treatment.     General  Reason for Referral: L hip clau  Referred By: Collette Mckenzie PA-C  General Comment: Visit # 2/12  Precautions  Precautions  Precautions Comment: Mild Fall Risk. PMH: S/P Arthroplasty Total Hip Posterior Approach 1/4/2024. WBAT with posterior hip precautions    Pain  Pain Assessment: 0-10  Pain Score: 0 - No pain     Treatments:   Therapeutic Exercise  Therapeutic Exercise Performed: Yes  Seated stepper 5'   Slat board 2 x 1'   Standing 3 way hip 2 x 10   Heel raises 2 x 10  Mini squats 2 x 10   Seated hip abd 2 x 10 orange band  Seated hip add 2 x 10 3'hold  Bridge 2 x 10  SLR 2 x 10        OP EDUCATION:   Access Code: [email or web address]  URL: [email or web address]  Date: 02/02/2024  Prepared by: Alyssia  Walker    Exercises  - Standing Hip Abduction with Counter Support  - 1 x daily - 7 x weekly - 2 sets - 10 reps  - Standing Hip Flexion with Counter Support  - 1 x daily - 7 x weekly - 2 sets - 10 reps  - Standing Hip Extension with Counter Support  - 1 x daily - 7 x weekly - 2 sets - 10 reps  - Heel Toe Raises with Unilateral Counter Support  - 1 x daily - 7 x weekly - 2 sets - 10 reps  - Seated Hip Abduction with Resistance  - 1 x daily - 7 x weekly - 2 sets - 10 reps  - Seated Hip Adduction Squeeze with Ball  - 1 x daily - 7 x weekly - 2 sets - 10 reps  - Active Straight Leg Raise with Quad Set  - 1 x daily - 7 x weekly - 2 sets - 10 reps    Goals:  Active       PT Problem       PT Goal 1       Start:  24    Expected End:  24       LT) Improve L hip strength from 4/5  to >= 5-/5 throughout in order to facilitate safe gait and mobility.       4-6 weeks      2) Improve L hip PROM  to  0-105 deg flex and 15-20 deg ER/IR once total hip precautions are lifted in order to facilitate safe gait and stair negotiation.       4-6 weeks        3) Improve L hip pain from   7/10 to <= 0-2/10 with activity in order to improve QOL.  4-6 weeks      4) Improve LEFS score by >= 5 points in order to improve QOL.    4-6 weeks      5) Pt will be able to walk longer distances, transition sit to stand from chair, negotiate stairs and return to exercise, or work around the home or on the job without significant limitation.    4-6 weeks      ST) Pt/caregiver will be I and consistent with HEP with use of handout as needed in order to maximize strength and flexibility.      2-3 weeks

## 2024-02-06 ENCOUNTER — TREATMENT (OUTPATIENT)
Dept: PHYSICAL THERAPY | Facility: CLINIC | Age: 44
End: 2024-02-06
Payer: COMMERCIAL

## 2024-02-06 DIAGNOSIS — M16.12 ARTHRITIS OF LEFT HIP: ICD-10-CM

## 2024-02-06 PROCEDURE — 97110 THERAPEUTIC EXERCISES: CPT | Mod: GP,CQ

## 2024-02-06 ASSESSMENT — PAIN SCALES - GENERAL: PAINLEVEL_OUTOF10: 0 - NO PAIN

## 2024-02-06 ASSESSMENT — PAIN - FUNCTIONAL ASSESSMENT: PAIN_FUNCTIONAL_ASSESSMENT: 0-10

## 2024-02-06 NOTE — PROGRESS NOTES
Physical Therapy Treatment    Patient Name: Angel Chadwick  MRN: [id number]  Today's Date: 2024  Time Calculation  Start Time: 30  Stop Time: 912  Time Calculation (min): 42 min      Assessment:   Patient identified with name and .   Pain-free throughout therex, only mild fatigue. Continue use of SPC during ambulation but he voiced walking without AD when in house.     Plan:  Will continue as per poc to improve functional strength and stability in left hip.  OP PT Plan  Treatment/Interventions: Cryotherapy, Education/ Instruction, Gait training, Manual therapy, Neuromuscular re-education, Therapeutic exercises (Manual Therapy including IASTM as needed)  PT Plan: Skilled PT  PT Frequency:  (2x/wk for 6 weeks or 13 sessions)  Rehab Potential: Good  Plan of Care Agreement: Patient (Patient Goal:  Improve strength and ability to walk and get around better.)    Current Problem  Problem List Items Addressed This Visit             ICD-10-CM    Arthritis of left hip M16.12       Subjective   Doing pretty well but still need to gain some strength in the left leg.     Precautions  Precautions  Precautions Comment: Mild Fall Risk. PMH: S/P Arthroplasty Total Hip Posterior Approach 2024. WBAT with posterior hip precautions    Pain  Pain Assessment: 0-10  Pain Score: 0 - No pain    Treatments:  Therapeutic Exercise  Therapeutic Exercise Performed: Yes  Therapeutic Exercise: 40 minutes, 3 units   Seated stepper 5'   Slat board 2 x 1'   Standing 3 way hip 2 x 10   Heel raises 2 x 10  Mini squats 2 x 10   Seated hip abd 2 x 10 orange band  Seated hip add 2 x 10 3'hold  Bridge 2 x 10  SLR 2 x 10     OP EDUCATION:   HEP reviewed and discussed proper frequency.    Goals:  Active       PT Problem       PT Goal 1       Start:  24    Expected End:  24       LT) Improve L hip strength from 4/5  to >= 5-/5 throughout in order to facilitate safe gait and mobility.       4-6 weeks      2) Improve L hip PROM  to   0-105 deg flex and 15-20 deg ER/IR once total hip precautions are lifted in order to facilitate safe gait and stair negotiation.       4-6 weeks        3) Improve L hip pain from   7/10 to <= 0-2/10 with activity in order to improve QOL.  4-6 weeks      4) Improve LEFS score by >= 5 points in order to improve QOL.    4-6 weeks      5) Pt will be able to walk longer distances, transition sit to stand from chair, negotiate stairs and return to exercise, or work around the home or on the job without significant limitation.    4-6 weeks      ST) Pt/caregiver will be I and consistent with HEP with use of handout as needed in order to maximize strength and flexibility.      2-3 weeks

## 2024-02-08 ENCOUNTER — TREATMENT (OUTPATIENT)
Dept: PHYSICAL THERAPY | Facility: CLINIC | Age: 44
End: 2024-02-08
Payer: COMMERCIAL

## 2024-02-08 DIAGNOSIS — M16.12 ARTHRITIS OF LEFT HIP: ICD-10-CM

## 2024-02-08 PROCEDURE — 97110 THERAPEUTIC EXERCISES: CPT | Mod: GP,CQ

## 2024-02-08 ASSESSMENT — PAIN - FUNCTIONAL ASSESSMENT: PAIN_FUNCTIONAL_ASSESSMENT: 0-10

## 2024-02-08 ASSESSMENT — PAIN SCALES - GENERAL: PAINLEVEL_OUTOF10: 0 - NO PAIN

## 2024-02-08 NOTE — PROGRESS NOTES
"Physical Therapy Treatment    Patient Name: Angel Chadwick  MRN: [id number]  Today's Date: 2/8/2024  Time Calculation  Start Time: 0834  Stop Time: 0915  Time Calculation (min): 41 min      Assessment:   Patient identified by name and date of birth. Patient presented with increased stiffness this date and demonstrated slow movement secondary to stiffness and soreness. Held progression of strengthening due to increased soreness this date. He presented with muscle tension in IT  band instructed with use of roller for HEP he verbalized good understanding.     Plan:  OP PT Plan  Treatment/Interventions: Cryotherapy, Education/ Instruction, Gait training, Manual therapy, Neuromuscular re-education, Therapeutic exercises (Manual Therapy including IASTM as needed)  PT Plan: Skilled PT  PT Frequency:  (2x/wk for 6 weeks or 13 sessions)  Rehab Potential: Good  Plan of Care Agreement: Patient (Patient Goal:  Improve strength and ability to walk and get around better.)  Continue with progression of strengthening and ROM with balance when appropriate for increased ease with ambulation.   Current Problem  Problem List Items Addressed This Visit             ICD-10-CM    Arthritis of left hip M16.12       Subjective Patient reported compliance with HEP  100% of the time and verbalized understanding.  He reported he did a lot of walking then night prior and feels that is part of his increased soreness/stiffness.   General  Reason for Referral: L hip clau  Referred By: Collette Mckenzie PA-C  General Comment: Visit # 3/12  Precautions  Precautions  Precautions Comment: Mild Fall Risk. PMH: S/P Arthroplasty Total Hip Posterior Approach 1/4/2024. WBAT with posterior hip precautions    Pain  Pain Assessment: 0-10  Pain Score: 0 - No pain  Pain Type:  (Stiffness)     Treatments:  Therapeutic Exercise  Therapeutic Exercise Performed: Yes  Seated stepper 5'   Slat board 2 x 1'   Step ups 4\" fwd 2 x 10 (N)  Standing 3 way hip 2 x 10   Heel " raises 2 x 10  Mini squats 2 x 10   Seated hip abd 2 x 10 orange band  Seated hip add 2 x 10 5' hold  Bridge 2 x 10   SLR 2 x 10     Manual Therapy  Manual Therapy Performed: Yes  STW to L IT band   OP EDUCATION:    Access Code: [email or web address]  URL: [email or web address]  Date: 2024  Prepared by: Alyssia Ovalle     Exercises  - Standing Hip Abduction with Counter Support  - 1 x daily - 7 x weekly - 2 sets - 10 reps  - Standing Hip Flexion with Counter Support  - 1 x daily - 7 x weekly - 2 sets - 10 reps  - Standing Hip Extension with Counter Support  - 1 x daily - 7 x weekly - 2 sets - 10 reps  - Heel Toe Raises with Unilateral Counter Support  - 1 x daily - 7 x weekly - 2 sets - 10 reps  - Seated Hip Abduction with Resistance  - 1 x daily - 7 x weekly - 2 sets - 10 reps  - Seated Hip Adduction Squeeze with Ball  - 1 x daily - 7 x weekly - 2 sets - 10 reps  - Active Straight Leg Raise with Quad Set  - 1 x daily - 7 x weekly - 2 sets - 10 reps       Goals:  Active       PT Problem       PT Goal 1       Start:  24    Expected End:  24       LT) Improve L hip strength from 4/5  to >= 5-/5 throughout in order to facilitate safe gait and mobility.       4-6 weeks      2) Improve L hip PROM  to  0-105 deg flex and 15-20 deg ER/IR once total hip precautions are lifted in order to facilitate safe gait and stair negotiation.       4-6 weeks        3) Improve L hip pain from   7/10 to <= 0-2/10 with activity in order to improve QOL.  4-6 weeks      4) Improve LEFS score by >= 5 points in order to improve QOL.    4-6 weeks      5) Pt will be able to walk longer distances, transition sit to stand from chair, negotiate stairs and return to exercise, or work around the home or on the job without significant limitation.    4-6 weeks      ST) Pt/caregiver will be I and consistent with HEP with use of handout as needed in order to maximize strength and flexibility.      2-3 weeks

## 2024-02-13 ENCOUNTER — DOCUMENTATION (OUTPATIENT)
Dept: PHYSICAL THERAPY | Facility: CLINIC | Age: 44
End: 2024-02-13

## 2024-02-13 ENCOUNTER — TREATMENT (OUTPATIENT)
Dept: PHYSICAL THERAPY | Facility: CLINIC | Age: 44
End: 2024-02-13
Payer: COMMERCIAL

## 2024-02-13 DIAGNOSIS — M16.12 ARTHRITIS OF LEFT HIP: Primary | ICD-10-CM

## 2024-02-13 PROCEDURE — 97110 THERAPEUTIC EXERCISES: CPT | Mod: GP | Performed by: PHYSICAL THERAPIST

## 2024-02-13 ASSESSMENT — PAIN SCALES - GENERAL: PAINLEVEL_OUTOF10: 0 - NO PAIN

## 2024-02-13 ASSESSMENT — PAIN - FUNCTIONAL ASSESSMENT: PAIN_FUNCTIONAL_ASSESSMENT: 0-10

## 2024-02-13 NOTE — PROGRESS NOTES
"Physical Therapy Treatment    Patient Name: Angel Chadwick  MRN: [id number]  Today's Date: 2/13/2024  Time Calculation  Start Time: 0830  Stop Time: 0910  Time Calculation (min): 40 min      PT Therapeutic Procedures Time Entry  Therapeutic Exercise Time Entry: 39                         General  Reason for Referral: L hip sugery  Referred By: Collette Mckenzie PA-C  General Comment: Visit # 5/12    Assessment:   Pt had good overall tolerance to exercises performed in treatment today.  Pt was challenged with new and progressed ex's performed.  No significant pain increase reported with ex's.       Plan:  OP PT Plan  Treatment/Interventions: Cryotherapy, Education/ Instruction, Gait training, Manual therapy, Neuromuscular re-education, Therapeutic exercises (Manual Therapy including IASTM as needed)  PT Plan: Skilled PT  PT Frequency:  (2x/wk for 6 weeks or 13 sessions)  Rehab Potential: Good  Plan of Care Agreement: Patient (Patient Goal:  Improve strength and ability to walk and get around better.)    Continue with progression of strengthening and ROM with balance when appropriate for increased ease with ambulation.     Current Problem  Problem List Items Addressed This Visit             ICD-10-CM       Musculoskeletal and Injuries    Arthritis of left hip - Primary M16.12       Subjective  Pt reports that his L hip feels pretty good overall today and that he is not having much pain with activity.    Precautions  Precautions  Precautions Comment: Mild Fall Risk. PMH: S/P Arthroplasty Total Hip Posterior Approach 1/4/2024. WBAT with posterior hip precautions    Pain  Pain Assessment: 0-10  Pain Score: 0 - No pain      Treatments:  Ther Ex:  39 min   3 units  Seated stepper 5'   Slat board 2 x 1'   Step ups 6\" fwd and lateral  x 15 reps ea  P  Standing 3 way hip 2 x 10  3#  P  Standing HS Curls  3#  2 x 10 reps  N  Heel raises 2 x 10  Mini squats 2 x 10   Shuttle Leg Press  B/L / U/L   50#/25#  2 x 10 reps  N  SLS on " Airex  3 x 10 sec hold  with min UE support as needed  N  Seated HS curls   Black  2 x 10 reps   N  Seated hip abd 2 x 10 black band  P  Seated hip add  BALL  2 x 10 5' hold  Bridge 2 x 10   X  SLR 2 x 10   X     Manual Therapy:  X  STW to L IT band     OP EDUCATION:   Edu on proper form and speed of movement with ex's.  Pt verbalized/demonstrated good understanding.      Goals:  Active       PT Problem       PT Goal 1       Start:  24    Expected End:  24       LT) Improve L hip strength from 4/5  to >= 5-/5 throughout in order to facilitate safe gait and mobility.       4-6 weeks      2) Improve L hip PROM  to  0-105 deg flex and 15-20 deg ER/IR once total hip precautions are lifted in order to facilitate safe gait and stair negotiation.       4-6 weeks        3) Improve L hip pain from   7/10 to <= 0-2/10 with activity in order to improve QOL.  4-6 weeks      4) Improve LEFS score by >= 5 points in order to improve QOL.    4-6 weeks      5) Pt will be able to walk longer distances, transition sit to stand from chair, negotiate stairs and return to exercise, or work around the home or on the job without significant limitation.    4-6 weeks      ST) Pt/caregiver will be I and consistent with HEP with use of handout as needed in order to maximize strength and flexibility.      2-3 weeks

## 2024-02-15 ENCOUNTER — TREATMENT (OUTPATIENT)
Dept: PHYSICAL THERAPY | Facility: CLINIC | Age: 44
End: 2024-02-15
Payer: COMMERCIAL

## 2024-02-15 DIAGNOSIS — M16.12 ARTHRITIS OF LEFT HIP: ICD-10-CM

## 2024-02-15 PROCEDURE — 97110 THERAPEUTIC EXERCISES: CPT | Mod: GP,CQ

## 2024-02-15 ASSESSMENT — PAIN - FUNCTIONAL ASSESSMENT: PAIN_FUNCTIONAL_ASSESSMENT: 0-10

## 2024-02-15 ASSESSMENT — PAIN SCALES - GENERAL: PAINLEVEL_OUTOF10: 0 - NO PAIN

## 2024-02-15 NOTE — PROGRESS NOTES
"Physical Therapy Treatment    Patient Name: Angel Chadwick  MRN: [id number]  Today's Date: 2/15/2024  Time Calculation  Start Time: 0830  Stop Time: 0915  Time Calculation (min): 45 min      Assessment:   Patient identified by name and date of birth. Patient was able to progress with addition of running man with mild sway noted. He demonstrated good understanding with exercises with minimal cues for proper form.     Plan:  OP PT Plan  Treatment/Interventions: Cryotherapy, Education/ Instruction, Gait training, Manual therapy, Neuromuscular re-education, Therapeutic exercises (Manual Therapy including IASTM as needed)  PT Plan: Skilled PT  PT Frequency:  (2x/wk for 6 weeks or 13 sessions)  Rehab Potential: Good  Plan of Care Agreement: Patient (Patient Goal:  Improve strength and ability to walk and get around better.)Co    Continue with progression of strengthening and ROM for decreased Sx and increased ease with ambulation.   Current Problem  Problem List Items Addressed This Visit             ICD-10-CM    Arthritis of left hip M16.12       Subjective Patient reported compliance with % of the time and verbalized understanding.  Patient reported 0/10 pain with mild fatigue after treatment.   General  Reason for Referral: L hip clau  Referred By: Collette Mckenzie PA-C  General Comment: Visit # 6/12  Precautions  Precautions  Precautions Comment: Mild Fall Risk. PMH: S/P Arthroplasty Total Hip Posterior Approach 1/4/2024. WBAT with posterior hip precautions    Pain  Pain Assessment: 0-10  Pain Score: 0 - No pain     Treatments:  Therapeutic Exercise  Therapeutic Exercise Performed: Yes  Seated stepper 5'   Slat board 2 x 1'   Step ups 6\" fwd and lateral  x 15 reps ea  P  Standing 3 way hip 2 x 10  3#    Standing HS Curls  3#  2 x 10 reps   Heel raises 2 x 10 3# on ankles   Mini squats 2 x 10 on airex (P)  SLS on Airex  3 x 30 sec hold  with min UE support as needed   BOSU Running man 2  x 30\"   Shuttle Leg Press "  B/L / U/L   50#/25#  2 x 10 reps  N  Seated HS curls Black  2 x 10 reps   N  Seated hip abd 2 x 10 black band  P  Seated hip add BALL  2 x 10 5' hold  Bridge 2 x 10   X  SLR 2 x 10   X    OP EDUCATION:   Access Code: [email or web address]  URL: [email or web address]  Date: 2024  Prepared by: Alyssia Ovalle     Exercises  - Standing Hip Abduction with Counter Support  - 1 x daily - 7 x weekly - 2 sets - 10 reps  - Standing Hip Flexion with Counter Support  - 1 x daily - 7 x weekly - 2 sets - 10 reps  - Standing Hip Extension with Counter Support  - 1 x daily - 7 x weekly - 2 sets - 10 reps  - Heel Toe Raises with Unilateral Counter Support  - 1 x daily - 7 x weekly - 2 sets - 10 reps  - Seated Hip Abduction with Resistance  - 1 x daily - 7 x weekly - 2 sets - 10 reps  - Seated Hip Adduction Squeeze with Ball  - 1 x daily - 7 x weekly - 2 sets - 10 reps  - Active Straight Leg Raise with Quad Set  - 1 x daily - 7 x weekly - 2 sets - 10 reps       Goals:  Active       PT Problem       PT Goal 1       Start:  24    Expected End:  24       LT) Improve L hip strength from 4/5  to >= 5-/5 throughout in order to facilitate safe gait and mobility.       4-6 weeks      2) Improve L hip PROM  to  0-105 deg flex and 15-20 deg ER/IR once total hip precautions are lifted in order to facilitate safe gait and stair negotiation.       4-6 weeks        3) Improve L hip pain from   7/10 to <= 0-2/10 with activity in order to improve QOL.  4-6 weeks      4) Improve LEFS score by >= 5 points in order to improve QOL.    4-6 weeks      5) Pt will be able to walk longer distances, transition sit to stand from chair, negotiate stairs and return to exercise, or work around the home or on the job without significant limitation.    4-6 weeks      ST) Pt/caregiver will be I and consistent with HEP with use of handout as needed in order to maximize strength and flexibility.      2-3 weeks

## 2024-02-19 ENCOUNTER — TREATMENT (OUTPATIENT)
Dept: PHYSICAL THERAPY | Facility: CLINIC | Age: 44
End: 2024-02-19
Payer: COMMERCIAL

## 2024-02-19 DIAGNOSIS — M16.12 ARTHRITIS OF LEFT HIP: ICD-10-CM

## 2024-02-19 PROCEDURE — 97110 THERAPEUTIC EXERCISES: CPT | Mod: GP,CQ

## 2024-02-19 ASSESSMENT — PAIN - FUNCTIONAL ASSESSMENT: PAIN_FUNCTIONAL_ASSESSMENT: 0-10

## 2024-02-19 ASSESSMENT — PAIN SCALES - GENERAL: PAINLEVEL_OUTOF10: 0 - NO PAIN

## 2024-02-19 NOTE — PROGRESS NOTES
"Physical Therapy Treatment    Patient Name: Angel Chadwick  MRN: [id number]  Today's Date: 2/19/2024  Time Calculation  Start Time: 1001  Stop Time: 1042  Time Calculation (min): 41 min  PT Therapeutic Procedures Time Entry  Therapeutic Exercise Time Entry: 39       Assessment:   Patient identified by name and date of birth. Patient was able to tolerate progression of strengthening with fatigue noted.  He demonstrated good understanding of exercises with minimal cues needed.     Plan:  OP PT Plan  Treatment/Interventions: Cryotherapy, Education/ Instruction, Gait training, Manual therapy, Neuromuscular re-education, Therapeutic exercises (Manual Therapy including IASTM as needed)  PT Plan: Skilled PT  PT Frequency:  (2x/wk for 6 weeks or 13 sessions)  Rehab Potential: Good  Plan of Care Agreement: Patient (Patient Goal:  Improve strength and ability to walk and get around better.)  Continue with progression of reps   Current Problem  Problem List Items Addressed This Visit             ICD-10-CM    Arthritis of left hip M16.12       Subjective Patient reported compliance with % of the time and verbalized understanding.    General  Reason for Referral: L hip clau  Referred By: Collette Mckenzie PA-C  General Comment: Visit # 7/12  Precautions  Precautions  Precautions Comment: Mild Fall Risk. PMH: S/P Arthroplasty Total Hip Posterior Approach 1/4/2024. WBAT with posterior hip precautions    Pain  Pain Assessment: 0-10  Pain Score: 0 - No pain     Treatments:  Therapeutic Exercise  Therapeutic Exercise Performed: Yes  Seated stepper 5'   Slat board 2 x 1'   Step ups 6\" fwd and lateral  2 x 10 reps ea    Standing 3 way hip 2 x 15  3#    Standing HS Curls  3#  2 x 10 reps   Heel raises 2 x 10 3# on ankles   Mini squats 2 x 10  SLS on Airex  3 x 30 sec hold  with min UE support as needed   BOSU Running man 2  x 30\"   Shuttle Leg Press  B/L / U/L   50#/25#  2 x 10 reps  N  Seated HS curls Black  2 x 10 reps   " "(X)  Bridge 2 x 10  with small march (P)  SLR 2 x 10  with 5\"Hold (P)  S/L hip abd (A)  Seated hip abd 2 x 10 black band  P  Seated hip add BALL  2 x 10 5' hold    OP EDUCATION:   Access Code: [email or web address]  URL: [email or web address]  Date: 2024  Prepared by: Alyssia Ovalle     Exercises  - Standing Hip Abduction with Counter Support  - 1 x daily - 7 x weekly - 2 sets - 10 reps  - Standing Hip Flexion with Counter Support  - 1 x daily - 7 x weekly - 2 sets - 10 reps  - Standing Hip Extension with Counter Support  - 1 x daily - 7 x weekly - 2 sets - 10 reps  - Heel Toe Raises with Unilateral Counter Support  - 1 x daily - 7 x weekly - 2 sets - 10 reps  - Seated Hip Abduction with Resistance  - 1 x daily - 7 x weekly - 2 sets - 10 reps  - Seated Hip Adduction Squeeze with Ball  - 1 x daily - 7 x weekly - 2 sets - 10 reps  - Active Straight Leg Raise with Quad Set  - 1 x daily - 7 x weekly - 2 sets - 10 reps       Goals:  Active       PT Problem       PT Goal 1       Start:  24    Expected End:  24       LT) Improve L hip strength from 4/5  to >= 5-/5 throughout in order to facilitate safe gait and mobility.       4-6 weeks      2) Improve L hip PROM  to  0-105 deg flex and 15-20 deg ER/IR once total hip precautions are lifted in order to facilitate safe gait and stair negotiation.       4-6 weeks        3) Improve L hip pain from   7/10 to <= 0-2/10 with activity in order to improve QOL.  4-6 weeks      4) Improve LEFS score by >= 5 points in order to improve QOL.    4-6 weeks      5) Pt will be able to walk longer distances, transition sit to stand from chair, negotiate stairs and return to exercise, or work around the home or on the job without significant limitation.    4-6 weeks      ST) Pt/caregiver will be I and consistent with HEP with use of handout as needed in order to maximize strength and flexibility.      2-3 weeks              "

## 2024-02-21 ENCOUNTER — TREATMENT (OUTPATIENT)
Dept: PHYSICAL THERAPY | Facility: CLINIC | Age: 44
End: 2024-02-21
Payer: COMMERCIAL

## 2024-02-21 DIAGNOSIS — M16.12 ARTHRITIS OF LEFT HIP: ICD-10-CM

## 2024-02-21 PROCEDURE — 97110 THERAPEUTIC EXERCISES: CPT | Mod: GP,CQ

## 2024-02-21 ASSESSMENT — PAIN SCALES - GENERAL: PAINLEVEL_OUTOF10: 0 - NO PAIN

## 2024-02-21 ASSESSMENT — PAIN - FUNCTIONAL ASSESSMENT: PAIN_FUNCTIONAL_ASSESSMENT: 0-10

## 2024-02-21 NOTE — PROGRESS NOTES
"Physical Therapy Treatment    Patient Name: Angel Chadwick  MRN: [id number]  Today's Date: 2/21/2024  Time Calculation  Start Time: 1315  Stop Time: 1400  Time Calculation (min): 45 min  PT Therapeutic Procedures Time Entry  Therapeutic Exercise Time Entry: 43       Assessment:   Patient identified by name and date of birth. Patient demonstrated improved strength this date and was able to progress with step height and additional strengthening exercises. He demonstrated muscle fatigue at end of treatment.     Plan:  OP PT Plan  Treatment/Interventions: Cryotherapy, Education/ Instruction, Gait training, Manual therapy, Neuromuscular re-education, Therapeutic exercises (Manual Therapy including IASTM as needed)  PT Plan: Skilled PT  PT Frequency:  (2x/wk for 6 weeks or 13 sessions)  Rehab Potential: Good  Plan of Care Agreement: Patient (Patient Goal:  Improve strength and ability to walk and get around better.)  Continue with progression of strengthening for increased ease and endurance with ambulation.   Current Problem  Problem List Items Addressed This Visit             ICD-10-CM    Arthritis of left hip M16.12       Subjective Patient reported compliance with % of the time and verbalized understanding. He reported he is able to walk 3 mins with his family. Patient reported 0/10 pain with fatigue after treatment.   General  Reason for Referral: L hip clau  Referred By: Collette Mckenzie PA-C  General Comment: Visit # 8/12  Precautions  Precautions  Precautions Comment: Mild Fall Risk. PMH: S/P Arthroplasty Total Hip Posterior Approach 1/4/2024. WBAT with posterior hip precautions  Pain  Pain Assessment: 0-10  Pain Score: 0 - No pain     Treatments:  Therapeutic Exercise  Therapeutic Exercise Performed: Yes  Slant board 2 x 1'   Step ups 12\" fwd 2 x 10 (P)  Step ups 6\" lateral  2 x 10 reps ea  (P)  Standing 3 way hip 2 x 15  3#  HEP   Standing HS Curls  3#  2 x 10 reps HEP   Heel raises 2 x 10 off 6\" step " "(P)  Mini squats 2 x 10   SLS on Airex  3 x 30 sec hold  with min UE support as needed   BOSU Running man 2 x 60\" (P)  BOSU lunges 2 x 10 alt (N)  Shuttle Leg Press  B/L / U/L 75#/25# 2 x 10 reps  (P)  Hurdles (N)  -ea LE lead 2 laps   -reciprocal x2 laps  -side stepping x2 laps   Bridge 2 x 10  with small march (P)  S/L hip abd 2 x 10  (N)  Hooklying hip add BALL  with LAQ 2 x 10 5' hold (P)  SLR 2 x 10  with 5\"Hold   Seated hip abd 2 x 10 black band (X)  Seated HS curls Black  2 x 10 reps   (X)    OP EDUCATION:    Access Code: [email or web address]  URL: [email or web address]  Date: 2024  Prepared by: Alyssia Ovalle     Exercises  - Standing Hip Abduction with Counter Support  - 1 x daily - 7 x weekly - 2 sets - 10 reps  - Standing Hip Flexion with Counter Support  - 1 x daily - 7 x weekly - 2 sets - 10 reps  - Standing Hip Extension with Counter Support  - 1 x daily - 7 x weekly - 2 sets - 10 reps  - Heel Toe Raises with Unilateral Counter Support  - 1 x daily - 7 x weekly - 2 sets - 10 reps  - Seated Hip Abduction with Resistance  - 1 x daily - 7 x weekly - 2 sets - 10 reps  - Seated Hip Adduction Squeeze with Ball  - 1 x daily - 7 x weekly - 2 sets - 10 reps  - Active Straight Leg Raise with Quad Set  - 1 x daily - 7 x weekly - 2 sets - 10 rep    Goals:  Active       PT Problem       PT Goal 1       Start:  24    Expected End:  24       LT) Improve L hip strength from 4/5  to >= 5-/5 throughout in order to facilitate safe gait and mobility.       4-6 weeks      2) Improve L hip PROM  to  0-105 deg flex and 15-20 deg ER/IR once total hip precautions are lifted in order to facilitate safe gait and stair negotiation.       4-6 weeks        3) Improve L hip pain from   7/10 to <= 0-2/10 with activity in order to improve QOL.  4-6 weeks      4) Improve LEFS score by >= 5 points in order to improve QOL.    4-6 weeks      5) Pt will be able to walk longer distances, transition sit " to stand from chair, negotiate stairs and return to exercise, or work around the home or on the job without significant limitation.    4-6 weeks      ST) Pt/caregiver will be I and consistent with HEP with use of handout as needed in order to maximize strength and flexibility.      2-3 weeks

## 2024-02-26 ENCOUNTER — TREATMENT (OUTPATIENT)
Dept: PHYSICAL THERAPY | Facility: CLINIC | Age: 44
End: 2024-02-26
Payer: COMMERCIAL

## 2024-02-26 DIAGNOSIS — M16.12 ARTHRITIS OF LEFT HIP: ICD-10-CM

## 2024-02-26 PROCEDURE — 97110 THERAPEUTIC EXERCISES: CPT | Mod: GP,CQ

## 2024-02-26 ASSESSMENT — PAIN SCALES - GENERAL: PAINLEVEL_OUTOF10: 0 - NO PAIN

## 2024-02-26 ASSESSMENT — PAIN - FUNCTIONAL ASSESSMENT: PAIN_FUNCTIONAL_ASSESSMENT: 0-10

## 2024-02-26 NOTE — PROGRESS NOTES
"Physical Therapy Treatment    Patient Name: Angel Chadwick  MRN: [id number]  Today's Date: 2/26/2024  Time Calculation  Start Time: 1000  Stop Time: 1043  Time Calculation (min): 43 min  PT Therapeutic Procedures Time Entry  Therapeutic Exercise Time Entry: 43       Assessment:   Patient identified by name and date of birth.  Patient was able to progress with strengthening with increased weight with shuttle press. Progressed with SLS to ball toss to rebounder he demonstrated moderate sway and required short rest breaks in between.     Plan:  OP PT Plan  Treatment/Interventions: Cryotherapy, Education/ Instruction, Gait training, Manual therapy, Neuromuscular re-education, Therapeutic exercises (Manual Therapy including IASTM as needed)  PT Plan: Skilled PT  PT Frequency:  (2x/wk for 6 weeks or 13 sessions)  Rehab Potential: Good  Plan of Care Agreement: Patient (Patient Goal:  Improve strength and ability to walk and get around better.)  Continue with progression of strengthening for normalization and endurence with ambulation.   Current Problem  Problem List Items Addressed This Visit             ICD-10-CM    Arthritis of left hip M16.12       Subjective Patient reported compliance with HEP  100% of the time and verbalized understanding.  He reported fatigue without pain after treatment.   General  Reason for Referral: L hip clau  Referred By: Collette Mckenzie PA-C  General Comment: Visit # 9/12  Precautions  Precautions  Precautions Comment: Mild Fall Risk. PMH: S/P Arthroplasty Total Hip Posterior Approach 1/4/2024. WBAT with posterior hip precautions    Pain  Pain Assessment: 0-10  Pain Score: 0 - No pain     Treatments:  Therapeutic Exercise  Therapeutic Exercise Performed: Yes  Seated stepper Level 4 5'   Slant board 2 x 1'   Step ups 12\" fwd 2 x 10   Step ups 6\" lateral  2 x 10 reps ea  (P)  Heel raises 2 x 10 off 6\" step (P)  Shuttle Leg Press  B/L / U/L 100#/40# 2 x 10 reps  (P)  Mini squats 2 x 10   BOSU " "lunges 2 x 10 alt   Running man 2 x 30\" ea LE lead   Standing donkey kicks 2 x 10 (N)  Standing fire hydrants 2 x 10 (N)  SLS with ball toss to re bounder 3 x 10 2# on Airex (P)  Bridge 2 x 20  with small march (P)  S/L hip abd 2 x 10    Hooklying hip add BALL  with LAQ 2 x 10 5' hold   SLR 2 x 10  with 5\"Hold (X)    Seated hip abd 2 x 10 black band (X)  Seated HS curls Black  2 x 10 reps   (X)  Standing 3 way hip 2 x 15  3#  HEP (X)  Standing HS Curls  3#  2 x 10 reps HEP (X)  Hurdles (X)  -ea LE lead 2 laps   -reciprocal x2 laps  -side stepping x2 laps   OP EDUCATION:     Access Code: [email or web address]  URL: [email or web address]  Date: 2024  Prepared by: Alyssia Ovalle     Exercises  - Standing Hip Abduction with Counter Support  - 1 x daily - 7 x weekly - 2 sets - 10 reps  - Standing Hip Flexion with Counter Support  - 1 x daily - 7 x weekly - 2 sets - 10 reps  - Standing Hip Extension with Counter Support  - 1 x daily - 7 x weekly - 2 sets - 10 reps  - Heel Toe Raises with Unilateral Counter Support  - 1 x daily - 7 x weekly - 2 sets - 10 reps  - Seated Hip Abduction with Resistance  - 1 x daily - 7 x weekly - 2 sets - 10 reps  - Seated Hip Adduction Squeeze with Ball  - 1 x daily - 7 x weekly - 2 sets - 10 reps  - Active Straight Leg Raise with Quad Set  - 1 x daily - 7 x weekly - 2 sets - 10 rep       Goals:  Active       PT Problem       PT Goal 1       Start:  24    Expected End:  24       LT) Improve L hip strength from 4/5  to >= 5-/5 throughout in order to facilitate safe gait and mobility.       4-6 weeks      2) Improve L hip PROM  to  0-105 deg flex and 15-20 deg ER/IR once total hip precautions are lifted in order to facilitate safe gait and stair negotiation.       4-6 weeks        3) Improve L hip pain from   710 to <= 0-2/10 with activity in order to improve QOL.  4-6 weeks      4) Improve LEFS score by >= 5 points in order to improve QOL.    4-6 " weeks      5) Pt will be able to walk longer distances, transition sit to stand from chair, negotiate stairs and return to exercise, or work around the home or on the job without significant limitation.    4-6 weeks      ST) Pt/caregiver will be I and consistent with HEP with use of handout as needed in order to maximize strength and flexibility.      2-3 weeks

## 2024-02-28 ENCOUNTER — TREATMENT (OUTPATIENT)
Dept: PHYSICAL THERAPY | Facility: CLINIC | Age: 44
End: 2024-02-28
Payer: COMMERCIAL

## 2024-02-28 DIAGNOSIS — M16.12 ARTHRITIS OF LEFT HIP: ICD-10-CM

## 2024-02-28 PROCEDURE — 97110 THERAPEUTIC EXERCISES: CPT | Mod: GP,CQ

## 2024-02-28 ASSESSMENT — PAIN SCALES - GENERAL: PAINLEVEL_OUTOF10: 0 - NO PAIN

## 2024-02-28 ASSESSMENT — PAIN - FUNCTIONAL ASSESSMENT: PAIN_FUNCTIONAL_ASSESSMENT: 0-10

## 2024-02-28 NOTE — PROGRESS NOTES
"Physical Therapy Treatment    Patient Name: Angel Chadwick  MRN: [id number]  Today's Date: 2/28/2024  Time Calculation  Start Time: 1316  Stop Time: 1356  Time Calculation (min): 40 min  PT Therapeutic Procedures Time Entry  Therapeutic Exercise Time Entry: 38       Assessment:   Patient identified by name and date of birth. Held progression this date due to patient report of increased soreness this date. Patient demonstrated good tolerance with exercises with short rest breaks between sets.     Plan:  OP PT Plan  Treatment/Interventions: Cryotherapy, Education/ Instruction, Gait training, Manual therapy, Neuromuscular re-education, Therapeutic exercises (Manual Therapy including IASTM as needed)  PT Plan: Skilled PT  PT Frequency:  (2x/wk for 6 weeks or 13 sessions)  Rehab Potential: Good  Plan of Care Agreement: Patient (Patient Goal:  Improve strength and ability to walk and get around better.)  Continue with progression of strengthening for increased ease with ambulation. AW   Current Problem  Problem List Items Addressed This Visit             ICD-10-CM    Arthritis of left hip M16.12       Subjective Patient reported compliance with % of the time and verbalized understanding.  Patient reported increased soreness/tension due to going for a walk the day prior. He reported no change in Sx after treatment this date.   General  Reason for Referral: L hip clau  Referred By: Collette Mckenzie PA-C  General Comment: Visit # 10/12  Precautions  Precautions  Precautions Comment: Mild Fall Risk. PMH: S/P Arthroplasty Total Hip Posterior Approach 1/4/2024. WBAT with posterior hip precautions    Pain  Pain Assessment: 0-10  Pain Score: 0 - No pain     Treatments:  Therapeutic Exercise  Therapeutic Exercise Performed: Yes  Seated stepper Level 4 5'   Slant board 2 x 1'   Step ups 12\" fwd 2 x 10   Step ups 6\" lateral  2 x 10 reps ea   Heel raises 2 x 10 off 6\" step   Shuttle Leg Press  B/L / U/L 100#/40# 2 x 10 reps  " "  Mini squats 2 x 10   BOSU lunges 2 x 10 alt   Running man 2 x 30\" ea LE lead   Standing donkey kicks 2 x 10   Standing fire hydrants 2 x 10   SLS with ball toss to re bounder 3 x 10 2# on Airex   Bridge 2 x 20  with small march (X)  S/L hip abd 2 x 10    Hooklying hip add BALL with LAQ 3 x 10 5' hold   SLR 2 x 10  with 5\"Hold      Seated hip abd 2 x 10 black band (X)  Seated HS curls Black  2 x 10 reps   (X)  Standing 3 way hip 2 x 15  3#  HEP (X)  Standing HS Curls  3#  2 x 10 reps HEP (X)  Hurdles (X)  -ea LE lead 2 laps   -reciprocal x2 laps  -side stepping x2 laps   OP EDUCATION:     Access Code: [email or web address]  URL: [email or web address]  Date: 2024  Prepared by: Alyssia Ovalle     Exercises  - Standing Hip Abduction with Counter Support  - 1 x daily - 7 x weekly - 2 sets - 10 reps  - Standing Hip Flexion with Counter Support  - 1 x daily - 7 x weekly - 2 sets - 10 reps  - Standing Hip Extension with Counter Support  - 1 x daily - 7 x weekly - 2 sets - 10 reps  - Heel Toe Raises with Unilateral Counter Support  - 1 x daily - 7 x weekly - 2 sets - 10 reps  - Seated Hip Abduction with Resistance  - 1 x daily - 7 x weekly - 2 sets - 10 reps  - Seated Hip Adduction Squeeze with Ball  - 1 x daily - 7 x weekly - 2 sets - 10 reps  - Active Straight Leg Raise with Quad Set  - 1 x daily - 7 x weekly - 2 sets - 10 rep       Goals:  Active       PT Problem       PT Goal 1 (Progressing)       Start:  24    Expected End:  24       LT) Improve L hip strength from 4/5  to >= 5-/5 throughout in order to facilitate safe gait and mobility.       4-6 weeks      2) Improve L hip PROM  to  0-105 deg flex and 15-20 deg ER/IR once total hip precautions are lifted in order to facilitate safe gait and stair negotiation.       4-6 weeks        3) Improve L hip pain from   10 to <= 0-2/10 with activity in order to improve QOL.  4-6 weeks      4) Improve LEFS score by >= 5 points in order to " improve QOL.    4-6 weeks      5) Pt will be able to walk longer distances, transition sit to stand from chair, negotiate stairs and return to exercise, or work around the home or on the job without significant limitation.    4-6 weeks      ST) Pt/caregiver will be I and consistent with HEP with use of handout as needed in order to maximize strength and flexibility.      2-3 weeks

## 2024-03-04 ENCOUNTER — TREATMENT (OUTPATIENT)
Dept: PHYSICAL THERAPY | Facility: CLINIC | Age: 44
End: 2024-03-04

## 2024-03-04 DIAGNOSIS — M16.12 ARTHRITIS OF LEFT HIP: ICD-10-CM

## 2024-03-04 PROCEDURE — 97110 THERAPEUTIC EXERCISES: CPT | Mod: GP,CQ

## 2024-03-04 ASSESSMENT — PAIN SCALES - GENERAL: PAINLEVEL_OUTOF10: 0 - NO PAIN

## 2024-03-04 ASSESSMENT — PAIN - FUNCTIONAL ASSESSMENT: PAIN_FUNCTIONAL_ASSESSMENT: 0-10

## 2024-03-04 NOTE — PROGRESS NOTES
"Physical Therapy Treatment    Patient Name: Angel Chadwick  MRN: [id number]  Today's Date: 3/4/2024  Time Calculation  Start Time: 1000  Stop Time: 1042  Time Calculation (min): 42 min  PT Therapeutic Procedures Time Entry  Therapeutic Exercise Time Entry: 40       Assessment:   Patient identified by name and date of birth. Patient demonstrated good tolerance with progression of strengthening. He demonstrated quick fatigue with single LE bridge but was able to complete.     Plan:  OP PT Plan  Treatment/Interventions: Cryotherapy, Education/ Instruction, Gait training, Manual therapy, Neuromuscular re-education, Therapeutic exercises (Manual Therapy including IASTM as needed)  PT Plan: Skilled PT  PT Frequency:  (2x/wk for 6 weeks or 13 sessions)  Rehab Potential: Good  Plan of Care Agreement: Patient (Patient Goal:  Improve strength and ability to walk and get around better.)  Continue with progression of strengthening for increased ease and endurence with ambulation.   Current Problem  Problem List Items Addressed This Visit             ICD-10-CM    Arthritis of left hip M16.12       Subjective Patient reported compliance with % of the time and verbalized understanding.  He reported fatigue without pain after treatment.   General  Reason for Referral: L hip clau  Referred By: Collette Mckenzie PA-C  General Comment: Visit # 11/12  Precautions  Precautions  Precautions Comment: Mild Fall Risk. PMH: S/P Arthroplasty Total Hip Posterior Approach 1/4/2024. WBAT with posterior hip precautions    Pain  Pain Assessment: 0-10  Pain Score: 0 - No pain     Treatments:  Therapeutic Exercise  Therapeutic Exercise Performed: Yes  Seated stepper Level 4 5'   Slant board 2 x 1'   Step ups 12\" fwd 2 x 10   Step ups 6\" lateral  2 x 10 reps ea   Heel tap 4\" 2 x 10 (N)  Heel raises 2 x 10 off 4\" step   Shuttle Leg Press  B/L / U/L 125#/50# 2 x 10 reps  (P)  Mini squats 2 x 10   BOSU lunges 2 x 10 alt   Running man 2 x 30\" ea LE " "lead   Standing donkey kicks 2 x 10   Standing fire hydrants 2 x 10   SLS with ball toss to re bounder 3 x 10 2# on Airex   Glut taps with holding BB 22\" plinth 2 x 10 (N)  Bridge 2 x 10  Single LEG (P)  S/L hip abd 2 x 10     Hooklying hip add BALL with LAQ 3 x 10 5' hold   SLR 2 x 10  w/2# weight with slow lower     Seated hip abd 2 x 10 black band (X)  Seated HS curls Black  2 x 10 reps   (X)  Standing 3 way hip 2 x 15  3#  HEP (X)  Standing HS Curls  3#  2 x 10 reps HEP (X)  Hurdles (X)  -ea LE lead 2 laps   -reciprocal x2 laps  -side stepping x2 laps       OP EDUCATION:    Access Code: [email or web address]  URL: [email or web address]  Date: 2024  Prepared by: Alyssia Ovalle     Exercises  - Standing Hip Abduction with Counter Support  - 1 x daily - 7 x weekly - 2 sets - 10 reps  - Standing Hip Flexion with Counter Support  - 1 x daily - 7 x weekly - 2 sets - 10 reps  - Standing Hip Extension with Counter Support  - 1 x daily - 7 x weekly - 2 sets - 10 reps  - Heel Toe Raises with Unilateral Counter Support  - 1 x daily - 7 x weekly - 2 sets - 10 reps  - Seated Hip Abduction with Resistance  - 1 x daily - 7 x weekly - 2 sets - 10 reps  - Seated Hip Adduction Squeeze with Ball  - 1 x daily - 7 x weekly - 2 sets - 10 reps  - Active Straight Leg Raise with Quad Set  - 1 x daily - 7 x weekly - 2 sets - 10 rep    Goals:  Active       PT Problem       PT Goal 1 (Progressing)       Start:  24    Expected End:  24       LT) Improve L hip strength from 4/5  to >= 5-/5 throughout in order to facilitate safe gait and mobility.       4-6 weeks      2) Improve L hip PROM  to  0-105 deg flex and 15-20 deg ER/IR once total hip precautions are lifted in order to facilitate safe gait and stair negotiation.       4-6 weeks        3) Improve L hip pain from   7/10 to <= 0-2/10 with activity in order to improve QOL.  4-6 weeks      4) Improve LEFS score by >= 5 points in order to improve QOL.    " 4-6 weeks      5) Pt will be able to walk longer distances, transition sit to stand from chair, negotiate stairs and return to exercise, or work around the home or on the job without significant limitation.    4-6 weeks      ST) Pt/caregiver will be I and consistent with HEP with use of handout as needed in order to maximize strength and flexibility.      2-3 weeks

## 2024-03-06 ENCOUNTER — TREATMENT (OUTPATIENT)
Dept: PHYSICAL THERAPY | Facility: CLINIC | Age: 44
End: 2024-03-06

## 2024-03-06 DIAGNOSIS — M16.12 ARTHRITIS OF LEFT HIP: ICD-10-CM

## 2024-03-06 PROCEDURE — 97110 THERAPEUTIC EXERCISES: CPT | Mod: GP,CQ

## 2024-03-06 ASSESSMENT — PAIN - FUNCTIONAL ASSESSMENT: PAIN_FUNCTIONAL_ASSESSMENT: 0-10

## 2024-03-06 ASSESSMENT — PAIN SCALES - GENERAL: PAINLEVEL_OUTOF10: 0 - NO PAIN

## 2024-03-06 NOTE — PROGRESS NOTES
"Physical Therapy Treatment    Patient Name: Angel Chadwick  MRN: [id number]  Today's Date: 3/6/2024  Time Calculation  Start Time: 1315  Stop Time: 1355  Time Calculation (min): 40 min  PT Therapeutic Procedures Time Entry  Therapeutic Exercise Time Entry: 38       Assessment:   Patient identified by name and date of birth. Patient demonstrated good understanding of exercises with minimal cues required for form and technique. He continues to present with decreased stance time on L LE.     Plan:  OP PT Plan  Treatment/Interventions: Cryotherapy, Education/ Instruction, Gait training, Manual therapy, Neuromuscular re-education, Therapeutic exercises (Manual Therapy including IASTM as needed)  PT Plan: Skilled PT  PT Frequency:  (2x/wk for 6 weeks or 13 sessions)  Rehab Potential: Good  Plan of Care Agreement: Patient (Patient Goal:  Improve strength and ability to walk and get around better.)  Patient has re-check with PT next visit.   Current Problem  Problem List Items Addressed This Visit             ICD-10-CM    Arthritis of left hip M16.12       Subjective Patient reported compliance with % of the time and verbalized understanding.  He reported 0/10 pain after treatment.  General  Reason for Referral: L hip sugery  Referred By: Collette Mckenzie PA-C  General Comment: Visit # 11/12  Precautions  Precautions  Precautions Comment: Mild Fall Risk. PMH: S/P Arthroplasty Total Hip Posterior Approach 1/4/2024. WBAT with posterior hip precautions    Pain  Pain Assessment: 0-10  Pain Score: 0 - No pain     Treatments:   Therapeutic Exercise  Therapeutic Exercise Performed: Yes  Seated stepper Level 4 5'   Slant board 2 x 1'   Step ups 12\" fwd 2 x 10   Step ups 6\" lateral  2 x 10 reps ea   Heel tap 4\" 2 x 10 (N)  Heel raises 2 x 10 off 4\" step   Shuttle Leg Press  B/L / U/L 125#/50# 2 x 10 reps  (P)  Mini squats 2 x 10   BOSU lunges 2 x 30\" alt   Running man 2 x 30\" ea LE lead   Standing donkey kicks 2 x 10   Standing " "fire hydrants 2 x 10   SLS with ball toss to re bounder 3 x 10 2# on Airex   Glut taps with holding BB 22\" plinth 2 x 10 (N)  SLR 2 x 10  w/2# weight with slow lower (no weight this date)   Not this date;   Bridge 2 x 10  Single LEG (P)  S/L hip abd 2 x 10     Hooklying hip add BALL with LAQ 3 x 10 5' hold   Seated hip abd 2 x 10 black band (X)  Seated HS curls Black  2 x 10 reps   (X)  Standing 3 way hip 2 x 15  3#  HEP (X)  Standing HS Curls  3#  2 x 10 reps HEP (X)  Hurdles (X)  -ea LE lead 2 laps   -reciprocal x2 laps  -side stepping x2 laps         OP EDUCATION:   Verbal review of HEP, he demonstrated good understanding.     Goals:  Active       PT Problem       PT Goal 1 (Progressing)       Start:  24    Expected End:  24       LT) Improve L hip strength from 4/5  to >= 5-/5 throughout in order to facilitate safe gait and mobility.       4-6 weeks      2) Improve L hip PROM  to  0-105 deg flex and 15-20 deg ER/IR once total hip precautions are lifted in order to facilitate safe gait and stair negotiation.       4-6 weeks        3) Improve L hip pain from   7/10 to <= 0-2/10 with activity in order to improve QOL.  4-6 weeks      4) Improve LEFS score by >= 5 points in order to improve QOL.    4-6 weeks      5) Pt will be able to walk longer distances, transition sit to stand from chair, negotiate stairs and return to exercise, or work around the home or on the job without significant limitation.    4-6 weeks      ST) Pt/caregiver will be I and consistent with HEP with use of handout as needed in order to maximize strength and flexibility.      2-3 weeks              "

## 2024-03-08 ENCOUNTER — HOSPITAL ENCOUNTER (OUTPATIENT)
Dept: RADIOLOGY | Facility: HOSPITAL | Age: 44
Discharge: HOME | End: 2024-03-08
Payer: COMMERCIAL

## 2024-03-08 ENCOUNTER — OFFICE VISIT (OUTPATIENT)
Dept: ORTHOPEDIC SURGERY | Facility: HOSPITAL | Age: 44
End: 2024-03-08
Payer: COMMERCIAL

## 2024-03-08 VITALS — HEIGHT: 69 IN | WEIGHT: 205 LBS | BODY MASS INDEX: 30.36 KG/M2

## 2024-03-08 DIAGNOSIS — M16.12 ARTHRITIS OF LEFT HIP: ICD-10-CM

## 2024-03-08 DIAGNOSIS — M16.12 ARTHRITIS OF LEFT HIP: Primary | ICD-10-CM

## 2024-03-08 PROBLEM — I83.812 VARICOSE VEINS OF LEFT LOWER EXTREMITY WITH PAIN: Status: ACTIVE | Noted: 2022-03-10

## 2024-03-08 PROBLEM — R05.9 COUGH, UNSPECIFIED: Status: RESOLVED | Noted: 2022-12-07 | Resolved: 2024-03-08

## 2024-03-08 PROBLEM — M25.551 PAIN OF BOTH HIP JOINTS: Status: ACTIVE | Noted: 2023-11-10

## 2024-03-08 PROBLEM — K52.9 ACUTE GASTROENTERITIS: Status: RESOLVED | Noted: 2022-12-07 | Resolved: 2024-03-08

## 2024-03-08 PROBLEM — M91.10: Status: ACTIVE | Noted: 2022-03-10

## 2024-03-08 PROBLEM — M25.552 PAIN OF BOTH HIP JOINTS: Status: ACTIVE | Noted: 2023-11-10

## 2024-03-08 PROBLEM — B34.9 VIRAL INFECTION: Status: RESOLVED | Noted: 2022-12-07 | Resolved: 2024-03-08

## 2024-03-08 PROBLEM — M25.559 ARTHRALGIA OF HIP: Status: ACTIVE | Noted: 2024-03-08

## 2024-03-08 PROCEDURE — 73502 X-RAY EXAM HIP UNI 2-3 VIEWS: CPT | Mod: LEFT SIDE | Performed by: RADIOLOGY

## 2024-03-08 PROCEDURE — 73502 X-RAY EXAM HIP UNI 2-3 VIEWS: CPT | Mod: LT

## 2024-03-08 PROCEDURE — 99024 POSTOP FOLLOW-UP VISIT: CPT | Performed by: PHYSICIAN ASSISTANT

## 2024-03-08 PROCEDURE — 1036F TOBACCO NON-USER: CPT | Performed by: PHYSICIAN ASSISTANT

## 2024-03-08 RX ORDER — DICYCLOMINE HYDROCHLORIDE 10 MG/1
CAPSULE ORAL
COMMUNITY
Start: 2024-01-02

## 2024-03-08 RX ORDER — NALOXONE HYDROCHLORIDE 0.4 MG/ML
INJECTION, SOLUTION INTRAMUSCULAR; INTRAVENOUS; SUBCUTANEOUS
COMMUNITY
Start: 2024-01-04

## 2024-03-08 RX ORDER — ONDANSETRON 4 MG/1
TABLET, ORALLY DISINTEGRATING ORAL EVERY 8 HOURS PRN
COMMUNITY
Start: 2024-01-02

## 2024-03-08 RX ORDER — DIPHENHYDRAMINE HYDROCHLORIDE 12.5 MG/5ML
LIQUID ORAL EVERY 6 HOURS PRN
COMMUNITY
Start: 2024-01-04

## 2024-03-08 NOTE — PROGRESS NOTES
History of Present Illness   Angel Chadwick is a 43 y.o. male presenting today for post-op check from [facility] on 24. Continues to do very well with essentially no complaints of pain. Has even started running and jogging again. Happy with his progress. No complaints or concerns.        Past Medical History:   Diagnosis Date    Abdominal pain     Acute gastroenteritis 2022    Arthritis     Chronic pain disorder     Cough, unspecified 2022    Gastroenteritis     Joint pain     Dzdp-Svodr-Cdxadmr disease     Diagnosed at age 7    Varicose vein of leg     Viral infection 2022       Medication Documentation Review Audit       Reviewed by Millicent Purvis LPN (Licensed Nurse) on 24 at 0922      Medication Order Taking? Sig Documenting Provider Last Dose Status   dicyclomine (Bentyl) 10 mg capsule [id number] Yes Take by mouth. Historical Provider, MD Taking Active   diphenhydrAMINE 12.5 mg/5 mL liquid [id number] Yes Take by mouth every 6 hours if needed. Historical Provider, MD Taking Active   naloxone (Narcan) 0.4 mg/mL injection [id number] Yes Infuse into a venous catheter. Historical Provider, MD Taking Active   ondansetron ODT (Zofran-ODT) 4 mg disintegrating tablet [id number] Yes Take by mouth every 8 hours if needed. Historical Provider, MD Taking Active   pantoprazole (ProtoNix) 40 mg EC tablet [id number]  Take 1 tablet (40 mg) by mouth once daily in the morning. Take before meals. Do not crush, chew, or split. Collette Mckenzie PA-C   24 2359     Discontinued 24 0922   traMADol (Ultram) 50 mg tablet [id number] Yes Take 1 tablet (50 mg) by mouth every 6 hours if needed for severe pain (7 - 10). Collette Mckenzie PA-C Taking Active                    No Known Allergies    Social History     Socioeconomic History    Marital status: Single     Spouse name: Not on file    Number of children: Not on file    Years of education: Not on file    Highest education level: Not on file    Occupational History    Not on file   Tobacco Use    Smoking status: Never    Smokeless tobacco: Never   Vaping Use    Vaping Use: Never used   Substance and Sexual Activity    Alcohol use: Yes     Comment: socially    Drug use: Not Currently     Types: Marijuana    Sexual activity: Defer   Other Topics Concern    Not on file   Social History Narrative    Not on file     Social Determinants of Health     Financial Resource Strain: Low Risk  (1/4/2024)    Overall Financial Resource Strain (CARDIA)     Difficulty of Paying Living Expenses: Not very hard   Food Insecurity: Not on file   Transportation Needs: No Transportation Needs (1/25/2024)    OASIS : Transportation     Lack of Transportation (Medical): No     Lack of Transportation (Non-Medical): No     Patient Unable or Declines to Respond: No   Physical Activity: Not on file   Stress: Not on file   Social Connections: Feeling Socially Integrated (1/25/2024)    OASIS : Social Isolation     Frequency of experiencing loneliness or isolation: Never   Intimate Partner Violence: Not on file   Housing Stability: Low Risk  (1/4/2024)    Housing Stability Vital Sign     Unable to Pay for Housing in the Last Year: No     Number of Places Lived in the Last Year: 1     Unstable Housing in the Last Year: No       Past Surgical History:   Procedure Laterality Date    OTHER SURGICAL HISTORY  02/23/2022    No history of surgery    WISDOM TOOTH EXTRACTION      age 18          Review of Systems:  30 point ROS reviewed and negative other than as listed in the HPI     Physical Exam:  Gen: The pt is A&Ox3, NAD, and appear state age and weight  Psychiatric: mood and affect are appropriate   Eyes: sclera are white, EOM grossly intact  ENT: MMM  Neck: supple, thyroid is midline  Respiratory: respirations are nonlabored, chest rise symmetric  CV: rate is regular by palpation of distal pulses  Abdomen: nondistended   Integument: no obvious cutaneous lesions noted. No signs of  lymphangitis. No signs of systemic edema.   MSK: left hip surgical incision well healing without edema, erythema, drainage, or other s/sx of infection. Appropriately tender to palpation around the incision. SILT throughout the leg intact. Intact plantarflexion and dorsiflexion. Foot warm and well perfused.      Imaging:  I personally reviewed multiple views of the  L hip  were obtained in the office today demonstrate stable position of the hardware.      Assessment   43 y.o. male post-op from [facility] on 1/4/24.     Plan:  Continue WBAT on LLE; OK to ease up on hip precautions at this point but we reviewed mindful movements   Incisions well healing; wound care instructed  Continue PT     Follow-up 3 months with 2 views left hip (ap and cross table lateral) with WB AP pelvis.     All of the patient's questions/concerns address and they are in agreement with the plan.

## 2024-03-11 ENCOUNTER — TREATMENT (OUTPATIENT)
Dept: PHYSICAL THERAPY | Facility: CLINIC | Age: 44
End: 2024-03-11

## 2024-03-11 DIAGNOSIS — M16.12 ARTHRITIS OF LEFT HIP: ICD-10-CM

## 2024-03-11 PROCEDURE — 97110 THERAPEUTIC EXERCISES: CPT | Mod: GP | Performed by: PHYSICAL THERAPIST

## 2024-03-11 ASSESSMENT — PAIN - FUNCTIONAL ASSESSMENT: PAIN_FUNCTIONAL_ASSESSMENT: 0-10

## 2024-03-11 ASSESSMENT — PAIN SCALES - GENERAL: PAINLEVEL_OUTOF10: 0 - NO PAIN

## 2024-03-11 NOTE — Clinical Note
March 11, 2024    Arun Boone, PT  [address] [facility]  Rehab Services  [facility] [address]    Patient: Angel Chadwick   YOB: 1980   Date of Visit: 3/11/2024       Dear Generic Provider Scanning  No address on file    The attached plan of care is being sent to you because your patient’s medical reimbursement requires that you certify the plan of care. Your signature is required to allow uninterrupted insurance coverage.      You may indicate your approval by signing below and faxing this form back to us at Dept Fax: [phone].    Please call Dept: [phone] with any questions or concerns.    Thank you for this referral,        Arun Boone, LINDEN  [facility] [address] [address] [facility] [address]    Payer: Payor: NADER / Plan: ANTHEM HMP / Product Type: *No Product type* /                                                                         Date:     Dear Arun Boone PT,     Re: Mr. Angel Chadwick, MRN:[id number]    I certify that I have reviewed the attached plan of care and it is medically necessary for Mr. Angel Chadwick (1980) who is under my care.          ______________________________________                    _________________  Provider name and credentials                                           Date and time                                                                                           Plan of Care 3/11/24   Effective from: 3/11/2024  Effective to: 3/11/2024    Plan ID: [id number]            Participants as of Finalize on 3/11/2024    Name Type Comments Contact Info    Arun Boone PT Physical Therapist  [phone]    Collette Mckenzie PA-C Physician Assistant  [phone]       Last Plan Note     Author: Arun Boone PT Status: Signed Last edited: 3/11/2024 10:00 AM       Physical Therapy Discharge/Treatment    Patient Name: Angel Chadwick  MRN: [id number]  Today's Date: 3/11/2024  Time Calculation  Start Time:  "1000  Stop Time: 1040  Time Calculation (min): 40 min      PT Therapeutic Procedures Time Entry  Therapeutic Exercise Time Entry: 39                         General  Reason for Referral: L hip sugery  Referred By: Collette Mckenzie PA-C  General Comment: Visit # 12/12    Assessment:   The pt has made good objective progress in PT with improved L Hip strength, ROM/flexibility.  The pt has MET or PARTIALLY MET most of his PT goals and is I with current HEP.  DC PT at this time.  Follow up with MD as needed.      Plan:  OP PT Plan  Treatment/Interventions: Cryotherapy, Education/ Instruction, Gait training, Manual therapy, Neuromuscular re-education, Therapeutic exercises (Manual Therapy including IASTM as needed)  PT Plan: Skilled PT  PT Frequency:  No Further Visits.   DC.    Rehab Potential: Good  Plan of Care Agreement: Patient (Patient Goal:  Improve strength and ability to walk and get around better.)    3/11/2024 PT DC Summary:    The pt has made good objective progress in PT with improved L Hip strength, ROM/flexibility.  The pt has MET or PARTIALLY MET most of his PT goals and is I with current HEP.  DC PT at this time.  Follow up with MD as needed.        Current Problem  Problem List Items Addressed This Visit             ICD-10-CM       Musculoskeletal and Injuries    Arthritis of left hip M16.12       Subjective  The pt reports that his L hip is doing well and that he feels ready for DC at this time.   Pt reports that he will continue with his HEP moving forward.      Precautions  Precautions  Precautions Comment: Mild Fall Risk. PMH: S/P Arthroplasty Total Hip Posterior Approach 1/4/2024. WBAT with posterior hip precautions    Pain  Pain Assessment: 0-10  Pain Score: 0 - No pain      Treatments:  Ther Ex:  39 min  3 units    Seated stepper Level 4 5'   Slant board 2 x 1'   Step ups 12\" fwd 2 x 10   Step ups 6\" lateral  2 x 10 reps ea   Heel tap 4\" 2 x 10   Heel raises 2 x 15 off 4\" step   Shuttle Leg Press  " "B/L / U/L 125#/50# 2 x 10 reps    Mini squats 2 x 10   BOSU lunges 2 x 30\" alt   SLS with basketball toss  3 x 20 sec  N  3/11/2024 PT DC Summary Completed.      Following Ex's X today:    Running man 2 x 30\" ea LE lead   Standing donkey kicks 2 x 10   Standing fire hydrants 2 x 10  Glut taps with holding BB 22\" plinth 2 x 10 (N)  SLR 2 x 10  w/2# weight with slow lower (no weight this date)   Not this date;   Bridge 2 x 10  Single LEG   S/L hip abd 2 x 10     Hooklying hip add BALL with LAQ 3 x 10 5' hold   Seated hip abd 2 x 10 black band (X)  Seated HS curls Black  2 x 10 reps   (X)  Standing 3 way hip 2 x 15  3#  HEP (X)  Standing HS Curls  3#  2 x 10 reps HEP (X)  Hurdles (X)  -ea LE lead 2 laps   -reciprocal x2 laps  -side stepping x2 laps     OP EDUCATION:   Edu on proper form and speed of movement with ex's.  Pt verbalized/demonstrated good understanding.      Goals:  Active       PT Problem       PT Goal 1 (Progressing)       Start:  24    Expected End:  24       LT) Improve L hip strength from 4/5  to >= 5-/5 throughout in order to facilitate safe gait and mobility.       4-6 weeks  3/11/2024, MET, L hip strength 5-/5 throughout at TN.    2) Improve L hip PROM  to  0-105 deg flex and 15-20 deg ER/IR once total hip precautions are lifted in order to facilitate safe gait and stair negotiation.       4-6 weeks    3/11/2024, MET, L hip PROM 0-105 deg flex and 15 deg ER/IR at DC.      3) Improve L hip pain from   7/10 to <= 0-3/10 with activity in order to improve QOL.  4-6 weeks  3/11/2024, MET, pt reports L hip pain range  0-3/10 with activity    4) Improve LEFS score by >= 5 points in order to improve QOL.    4-6 weeks  3/11/2024, MET, pt scored a 40 at baseline and a 69 at DC.      5) Pt will be able to walk longer distances, transition sit to stand from chair, negotiate stairs and return to exercise, or work around the home or on the job without significant limitation.      4-6 " weeks  3/11/2024, MET, pt reports that the above activity is going a lot better without significant pain or limitation but the pt is not working yet.      ST) Pt/caregiver will be I and consistent with HEP with use of handout as needed in order to maximize strength and flexibility.      2-3 weeks  3/11/2024, MET, pt is I with HEP and has handouts.                     Current Participants as of 3/11/2024    Name Type Comments Contact Info    Arun Boone, PT Physical Therapist  [phone]    Signature pending    Collette Mckenzie PA-C Physician Assistant  [phone]

## 2024-03-11 NOTE — PROGRESS NOTES
Physical Therapy Discharge/Treatment    Patient Name: Angel Chadwick  MRN: [id number]  Today's Date: 3/11/2024  Time Calculation  Start Time: 1000  Stop Time: 1040  Time Calculation (min): 40 min      PT Therapeutic Procedures Time Entry  Therapeutic Exercise Time Entry: 39                         General  Reason for Referral: L hip sugery  Referred By: Collette Mckenzie PA-C  General Comment: Visit # 12/12    Assessment:   The pt has made good objective progress in PT with improved L Hip strength, ROM/flexibility.  The pt has MET or PARTIALLY MET most of his PT goals and is I with current HEP.  DC PT at this time.  Follow up with MD as needed.      Plan:  OP PT Plan  Treatment/Interventions: Cryotherapy, Education/ Instruction, Gait training, Manual therapy, Neuromuscular re-education, Therapeutic exercises (Manual Therapy including IASTM as needed)  PT Plan: Skilled PT  PT Frequency:  No Further Visits.   DC.    Rehab Potential: Good  Plan of Care Agreement: Patient (Patient Goal:  Improve strength and ability to walk and get around better.)    3/11/2024 PT DC Summary:    The pt has made good objective progress in PT with improved L Hip strength, ROM/flexibility.  The pt has MET or PARTIALLY MET most of his PT goals and is I with current HEP.  DC PT at this time.  Follow up with MD as needed.        Current Problem  Problem List Items Addressed This Visit             ICD-10-CM       Musculoskeletal and Injuries    Arthritis of left hip M16.12       Subjective  The pt reports that his L hip is doing well and that he feels ready for DC at this time.   Pt reports that he will continue with his HEP moving forward.      Precautions  Precautions  Precautions Comment: Mild Fall Risk. PMH: S/P Arthroplasty Total Hip Posterior Approach 1/4/2024. WBAT with posterior hip precautions    Pain  Pain Assessment: 0-10  Pain Score: 0 - No pain      Treatments:  Ther Ex:  39 min  3 units    Seated stepper Level 4 5'   Slant board 2 x  "1'   Step ups 12\" fwd 2 x 10   Step ups 6\" lateral  2 x 10 reps ea   Heel tap 4\" 2 x 10   Heel raises 2 x 15 off 4\" step   Shuttle Leg Press  B/L / U/L 125#/50# 2 x 10 reps    Mini squats 2 x 10   BOSU lunges 2 x 30\" alt   SLS with basketball toss  3 x 20 sec  N  3/11/2024 PT DC Summary Completed.      Following Ex's X today:    Running man 2 x 30\" ea LE lead   Standing donkey kicks 2 x 10   Standing fire hydrants 2 x 10  Glut taps with holding BB 22\" plinth 2 x 10 (N)  SLR 2 x 10  w/2# weight with slow lower (no weight this date)   Not this date;   Bridge 2 x 10  Single LEG   S/L hip abd 2 x 10     Hooklying hip add BALL with LAQ 3 x 10 5' hold   Seated hip abd 2 x 10 black band (X)  Seated HS curls Black  2 x 10 reps   (X)  Standing 3 way hip 2 x 15  3#  HEP (X)  Standing HS Curls  3#  2 x 10 reps HEP (X)  Hurdles (X)  -ea LE lead 2 laps   -reciprocal x2 laps  -side stepping x2 laps     OP EDUCATION:   Edu on proper form and speed of movement with ex's.  Pt verbalized/demonstrated good understanding.      Goals:  Active       PT Problem       PT Goal 1 (Progressing)       Start:  24    Expected End:  24       LT) Improve L hip strength from 4/5  to >= 5-/5 throughout in order to facilitate safe gait and mobility.       4-6 weeks  3/11/2024, MET, L hip strength 5-/5 throughout at GA.    2) Improve L hip PROM  to  0-105 deg flex and 15-20 deg ER/IR once total hip precautions are lifted in order to facilitate safe gait and stair negotiation.       4-6 weeks    3/11/2024, MET, L hip PROM 0-105 deg flex and 15 deg ER/IR at GA.      3) Improve L hip pain from   7/10 to <= 0-3/10 with activity in order to improve QOL.  4-6 weeks  3/11/2024, MET, pt reports L hip pain range  0-3/10 with activity    4) Improve LEFS score by >= 5 points in order to improve QOL.    4-6 weeks  3/11/2024, MET, pt scored a 40 at baseline and a 69 at DC.      5) Pt will be able to walk longer distances, transition sit to stand " from chair, negotiate stairs and return to exercise, or work around the home or on the job without significant limitation.      4-6 weeks  3/11/2024, MET, pt reports that the above activity is going a lot better without significant pain or limitation but the pt is not working yet.      ST) Pt/caregiver will be I and consistent with HEP with use of handout as needed in order to maximize strength and flexibility.      2-3 weeks  3/11/2024, MET, pt is I with HEP and has handouts.

## 2024-06-07 ENCOUNTER — APPOINTMENT (OUTPATIENT)
Dept: ORTHOPEDIC SURGERY | Facility: HOSPITAL | Age: 44
End: 2024-06-07

## 2024-06-28 ENCOUNTER — HOSPITAL ENCOUNTER (OUTPATIENT)
Dept: RADIOLOGY | Facility: HOSPITAL | Age: 44
Discharge: HOME | End: 2024-06-28

## 2024-06-28 ENCOUNTER — OFFICE VISIT (OUTPATIENT)
Dept: ORTHOPEDIC SURGERY | Facility: HOSPITAL | Age: 44
End: 2024-06-28

## 2024-06-28 VITALS — HEIGHT: 69 IN | WEIGHT: 205 LBS | BODY MASS INDEX: 30.36 KG/M2

## 2024-06-28 DIAGNOSIS — M16.12 ARTHRITIS OF LEFT HIP: ICD-10-CM

## 2024-06-28 PROCEDURE — 73502 X-RAY EXAM HIP UNI 2-3 VIEWS: CPT | Mod: LT

## 2024-06-28 PROCEDURE — 99214 OFFICE O/P EST MOD 30 MIN: CPT | Performed by: ORTHOPAEDIC SURGERY

## 2024-06-28 PROCEDURE — 73502 X-RAY EXAM HIP UNI 2-3 VIEWS: CPT | Mod: LEFT SIDE | Performed by: RADIOLOGY

## 2024-06-28 PROCEDURE — 1036F TOBACCO NON-USER: CPT | Performed by: ORTHOPAEDIC SURGERY

## 2024-06-28 ASSESSMENT — PAIN - FUNCTIONAL ASSESSMENT: PAIN_FUNCTIONAL_ASSESSMENT: NO/DENIES PAIN

## 2024-06-28 NOTE — PROGRESS NOTES
44-year-old male presenting 6 months out from elective left total hip replacement with significant deformity.  He is doing exceptionally well and is very happy this result so far.  He has been hiking and putting many miles on his hip on a weekly basis.  He really has no pain when it comes to daily activities.  He really was not even able to elicit soreness after walking 40 miles in a week at [place] with his family.  He is very happy so far.    The patient does not endorse fevers and chills. The patient does not endorse any change in her vision or hearing. They do not endorse chest pain, shortness of breath. The patient does not endorse any abdominal discomfort. They do not endorse any skin irritation or lesions. They do not endorse any new numbness and tingling or as otherwise stated in the history of present illness.    He is in no acute distress, alert and oriented x 3.    Mood and affect are appropriate.    Respirations are unlabored.    Distal limb is pink and well perfused.    Left lower extremity evaluation demonstrates well-healed surgical incision.  No pain to passive or active range of motion of the hip.  Has a nonantalgic reciprocal gait.  Sensation is intact to light touch in the tibial, sural, saphenous, superficial peroneal, and deep peroneal nerve distributions. Foot is warm and well-perfused.    Multiple x-rays taken of the hip and pelvis today demonstrate a well aligned well fixed total hip arthroplasty without signs of polyethylene wear.    44-year-old male 6 months out from left total hip replacement with significant shortening and deformity.  He is doing very well and can continue selective posterior hip precautions.  He knows what tibias he can tolerate and is doing very well.  I will see him back for routine surveillance in 6 months time with a standing AP pelvis and AP and crosstable lateral view of the left hip.    Natural History reviewed. All questions answered. The patient was in  agreement with the plan.      **This note was created using voice recognition software and was not corrected for typographical or grammatical errors.**

## 2024-12-27 ENCOUNTER — OFFICE VISIT (OUTPATIENT)
Dept: ORTHOPEDIC SURGERY | Facility: HOSPITAL | Age: 44
End: 2024-12-27

## 2024-12-27 ENCOUNTER — HOSPITAL ENCOUNTER (OUTPATIENT)
Dept: RADIOLOGY | Facility: HOSPITAL | Age: 44
Discharge: HOME | End: 2024-12-27

## 2024-12-27 DIAGNOSIS — M16.12 ARTHRITIS OF LEFT HIP: ICD-10-CM

## 2024-12-27 DIAGNOSIS — M16.12 ARTHRITIS OF LEFT HIP: Primary | ICD-10-CM

## 2024-12-27 PROCEDURE — 99213 OFFICE O/P EST LOW 20 MIN: CPT | Performed by: PHYSICIAN ASSISTANT

## 2024-12-27 PROCEDURE — 1036F TOBACCO NON-USER: CPT | Performed by: PHYSICIAN ASSISTANT

## 2024-12-27 PROCEDURE — 73502 X-RAY EXAM HIP UNI 2-3 VIEWS: CPT | Mod: LT

## 2024-12-27 NOTE — PROGRESS NOTES
44-year-old male presenting about 1 year out from elective left total hip replacement.  Overall he is very happy with his activities.  He feels significantly better than he did prior to hip replacement and feels that he has actually been more active since having his hip replaced.  He was actually able to go snowboarding the other day without any issues and has an upcoming trip to the [place] planned.  No complaints or concerns at today's visit.    The patient does not endorse fevers and chills. The patient does not endorse any change in her vision or hearing. They do not endorse chest pain, shortness of breath. The patient does not endorse any abdominal discomfort. They do not endorse any skin irritation or lesions. They do not endorse any new numbness and tingling or as otherwise stated in the history of present illness.    He is in no acute distress, alert and oriented x 3.    Mood and affect are appropriate.    Respirations are unlabored.    Distal limb is pink and well perfused.    Left lower extremity evaluation demonstrates well-healed surgical incision.  No pain to passive or active range of motion of the hip.  Has a nonantalgic reciprocal gait.  Sensation is intact to light touch in the tibial, sural, saphenous, superficial peroneal, and deep peroneal nerve distributions. Foot is warm and well-perfused.    Multiple x-rays taken of the hip and pelvis today demonstrate a well aligned well fixed total hip arthroplasty without signs of polyethylene wear.    44-year-old male 1 year out out from left total hip replacement with significant shortening and deformity.  He is doing very well and can continue selective posterior hip precautions.  We will see him back for routine surveillance in 1 year time with a standing AP pelvis and AP and crosstable lateral view of the left hip.    Natural History reviewed. All questions answered. The patient was in agreement with the plan.      **This note was created using  voice recognition software and was not corrected for typographical or grammatical errors.**

## 2025-07-09 ENCOUNTER — OFFICE VISIT (OUTPATIENT)
Dept: URGENT CARE | Facility: CLINIC | Age: 45
End: 2025-07-09
Payer: COMMERCIAL

## 2025-07-09 ENCOUNTER — HOSPITAL ENCOUNTER (OUTPATIENT)
Dept: RADIOLOGY | Facility: CLINIC | Age: 45
Discharge: HOME | End: 2025-07-09
Payer: COMMERCIAL

## 2025-07-09 ENCOUNTER — HOSPITAL ENCOUNTER (OUTPATIENT)
Dept: RADIOLOGY | Facility: HOSPITAL | Age: 45
Discharge: HOME | End: 2025-07-09
Payer: COMMERCIAL

## 2025-07-09 ENCOUNTER — TELEPHONE (OUTPATIENT)
Dept: URGENT CARE | Facility: CLINIC | Age: 45
End: 2025-07-09

## 2025-07-09 VITALS
TEMPERATURE: 97.7 F | HEART RATE: 62 BPM | SYSTOLIC BLOOD PRESSURE: 124 MMHG | RESPIRATION RATE: 17 BRPM | BODY MASS INDEX: 30.06 KG/M2 | DIASTOLIC BLOOD PRESSURE: 80 MMHG | WEIGHT: 210 LBS | HEIGHT: 70 IN | OXYGEN SATURATION: 97 %

## 2025-07-09 DIAGNOSIS — N50.82 ACUTE PAIN IN SCROTUM: ICD-10-CM

## 2025-07-09 DIAGNOSIS — I86.1 VARICOCELE: ICD-10-CM

## 2025-07-09 DIAGNOSIS — N50.811 PAIN IN RIGHT TESTICLE: ICD-10-CM

## 2025-07-09 DIAGNOSIS — I86.1 VARICOCELE: Primary | ICD-10-CM

## 2025-07-09 LAB
POC APPEARANCE, URINE: CLEAR
POC BILIRUBIN, URINE: NEGATIVE
POC BLOOD, URINE: NEGATIVE
POC COLOR, URINE: YELLOW
POC GLUCOSE, URINE: NEGATIVE MG/DL
POC KETONES, URINE: NEGATIVE MG/DL
POC LEUKOCYTES, URINE: NEGATIVE
POC NITRITE,URINE: NEGATIVE
POC PH, URINE: 5.5 PH
POC PROTEIN, URINE: NEGATIVE MG/DL
POC SPECIFIC GRAVITY, URINE: 1.02
POC UROBILINOGEN, URINE: 0.2 EU/DL

## 2025-07-09 PROCEDURE — 81002 URINALYSIS NONAUTO W/O SCOPE: CPT | Performed by: NURSE PRACTITIONER

## 2025-07-09 PROCEDURE — 74176 CT ABD & PELVIS W/O CONTRAST: CPT

## 2025-07-09 PROCEDURE — 74176 CT ABD & PELVIS W/O CONTRAST: CPT | Performed by: RADIOLOGY

## 2025-07-09 PROCEDURE — 93975 VASCULAR STUDY: CPT

## 2025-07-09 PROCEDURE — 99213 OFFICE O/P EST LOW 20 MIN: CPT | Mod: 25 | Performed by: NURSE PRACTITIONER

## 2025-07-09 RX ORDER — NAPROXEN 500 MG/1
500 TABLET ORAL
Qty: 20 TABLET | Refills: 0 | Status: SHIPPED | OUTPATIENT
Start: 2025-07-09 | End: 2025-07-19

## 2025-07-09 RX ORDER — NAPROXEN 500 MG/1
500 TABLET ORAL 2 TIMES DAILY PRN
Qty: 60 TABLET | Refills: 0 | Status: CANCELLED | OUTPATIENT
Start: 2025-07-09 | End: 2025-08-08

## 2025-07-09 NOTE — TELEPHONE ENCOUNTER
Result Communication    Resulted Orders   CT abdomen pelvis wo IV contrast    Narrative    Interpreted By:  Arun Garduno,   STUDY:  CT ABDOMEN PELVIS WO IV CONTRAST;  7/9/2025 3:19 pm      INDICATION:  Signs/Symptoms:recommended due to varicocele on US.      ,I86.1 Scrotal varices      COMPARISON:  July 9, 2025 scrotal ultrasound, January 4, 2024 pelvis radiograph.      ACCESSION NUMBER(S):  [id number]      ORDERING CLINICIAN:  ZACK MCALLISTER      TECHNIQUE:  CT of the abdomen and pelvis was performed. Contiguous axial images  were obtained at 3 mm slice thickness through the abdomen and pelvis.  Coronal and sagittal reconstructions at 3 mm slice thickness were  performed.  No intravenous contrast was administered; positive oral  contrast was given. Supplemental images through the inferior scrotum  were obtained and reviewed.      FINDINGS:  Please note that the evaluation of vessels, lymph nodes and organs is  limited without intravenous contrast.      LOWER CHEST:  Mild bibasilar platelike atelectasis or scar. There is a triangular  juxta fissural nodule right mid chest measuring 0.6 cm (Series 6,  Image 73). The appearance is suggestive of a fissural lymph node  although nonspecific. Mild scattered arterial vascular calcifications  including aortic annulus and coronary arteries.      ABDOMEN:      LIVER:  The liver demonstrates homogeneous attenuation without evidence of  focal liver lesions.      BILE DUCTS:  Normal caliber.      GALLBLADDER:  No calcified stones. No wall thickening.      PANCREAS:  No significant abnormality.      SPLEEN:  Borderline spleen size. The AP dimension is 14.3 cm (Series 3, Image  40)      ADRENAL GLANDS:  No significant abnormality.      KIDNEYS AND URETERS:  The kidneys demonstrate normal size and morphology. There are 2  probably benign homogeneous fluid density nodules left kidney  measuring 1.6 cm (Series 3, Image 63) and 1.1 cm (Series 3, Image  59). No hydroureteronephrosis  or nephroureterolithiasis is  identified. Portion of the distal left ureters obscured by streak  artifact from left hip arthroplasty.      PELVIS: Streak artifact from left hip arthroplasty partially obscures  adjacent structures.      BLADDER:  No gross abnormality although partially obscured by streak artifact.      REPRODUCTIVE ORGANS:  No significant abnormality.      BOWEL:  Nondilated. No pneumatosis. No surrounding inflammatory change.  Normal caliber appendix.      VESSELS:  Mild scattered arterial vascular calcifications. No aneurysm.      The right gonadal vein near the IVC insertion measures 0.5 cm (Series  3, Image 81), which is borderline.      PERITONEUM/RETROPERITONEUM/LYMPH NODES:  No ascites or free air, no fluid collection. No evident mass. No  abdominopelvic lymphadenopathy is present.      ABDOMINAL WALL:  Asymmetric fat traversing the right inguinal canal and along the  proximal right spermatic cord favors a small indirect fat containing  hernia (Series 6, Image 38). Unilateral right-sided inguinal scrotal  varicocele better evaluated by prior ultrasound. There are metallic  clips along the spermatic cord bilateral.      BONES:  Left hip arthroplasty hardware demonstrates no periprosthetic  fracture or evident failure. No suspicious osseous lesions. Mild  degenerative changes lumbar spine.        Impression    1.  No retroperitoneal mass.  2. Borderline caliber of the right testicular vein at the IVC  insertion. Right inguinal scrotal varicocele better evaluated by  ultrasound.  3. Probable small fat containing right indirect inguinal hernia.  4. Probably benign left renal nodules favoring simple cysts.  5. 6 mm triangular juxta fissural nodule right chest. The pattern  favors prominent fissural lymph node although nonspecific. As a  precaution consider a 1 year follow-up.          MACRO:  Incidental Finding:  A solid non-calcified pulmonary nodule measuring  6-8 mm .  (**-YCF-**)       Instructions:  Consider follow up non contrast chest CT at 6-12  months, then consider CT chest at 18-24 months. (Daryl Maldonado et  al., Guidelines for management of incidental pulmonary nodules  detected on CT images: From the Fleischner Society 2017, Radiology.  2017 Jul;284 (1):228-243.) [email or web address]      Incidental Finding: Renal lesion showing homogeneous attenuation and  thin wall with no septation/calcification/mural nodule. Although, not  fully characterized, likely benign/simple cyst due to low internal  attenuation. (**-YCF-**)      Instructions: No further workup is needed.  (Selvin BR, Robert SG, Tianna YUSUF, et al. Management of the  Incidental Renal Mass on CT: A White Paper of the ACR Incidental  Findings Committee. J Am Aliya Radiol. 2018;15(2):264-273.)  [email or web address]          Signed by: Arun Garduno 7/9/2025 4:51 PM  Dictation workstation:   [id number]       5:04 PM      Results were successfully communicated with the patient and they acknowledged their understanding.  Reviewed CT scan with patient and need for follow-up CT chest and kidney.  Patient states he does not have a primary care provider, referral placed for Dr. Albright to establish primary care in the next several months.  Also referral placed this morning for urology states unable to be seen until August.  Will place referral to [facility] urology.  Advised patient to call into office tomorrow morning and schedule appointment with nurse practitioner who can see patients faster at this time.  If any questions patient will return phone call to our facility.

## 2025-07-09 NOTE — PROGRESS NOTES
"[facility] URGENT CARE YUNG NOTE:      Name: Angel Chadwick, 45 y.o.    CSN:[id number]   MRN:[id number]    PCP: No Assigned PCP Generic Provider, MD    ALL:  Allergies[1]    History:    Chief Complaint: Groin Pain (Pain more on right side, groin into testicles/Pt explained it as a \"pop\")    Encounter Date: 7/9/2025     HPI: The history was obtained from the patient. Angel is a 45 y.o. male, who presents with a chief complaint of Groin Pain (Pain more on right side, groin into testicles/Pt explained it as a \"pop\").  States he had been outside working/lifting objects, when he felt a sudden tension and pop in his scrotal area.  Pain is primarily localized to the right groin, superior to right testicle.  Has been using over-the-counter pain medication and icing regularly, with some relief with scrotal elevation.  Denies any significant swelling at this time, urethral discharge, frequency, or other irritative voiding symptoms.  Pain is intermittent, worse with movement, and \"feels like I am constantly being hit in the balls\". Reports potential for sexually transmitted diseases highly unlikely.  Scrotum visualized without any significant swelling, erythema, or signs of obvious cystocele/varicocele. Marked tenderness noted with palpation of right posterior teste and epididymitis. Cremasteric reflex positive for both testicles.    PMHx:    Medical History[2]         Current Medications[3]      PMSx:  Surgical History[4]    Fam Hx: Family History[5]    SOC. Hx:     Social History     Socioeconomic History    Marital status: Single     Spouse name: Not on file    Number of children: Not on file    Years of education: Not on file    Highest education level: Not on file   Occupational History    Not on file   Tobacco Use    Smoking status: Never    Smokeless tobacco: Never   Vaping Use    Vaping status: Never Used   Substance and Sexual Activity    Alcohol use: Yes     Comment: socially    Drug use: Not " Currently     Types: Marijuana    Sexual activity: Defer   Other Topics Concern    Not on file   Social History Narrative    Not on file     Social Drivers of Health     Financial Resource Strain: Low Risk  (1/4/2024)    Overall Financial Resource Strain (CARDIA)     Difficulty of Paying Living Expenses: Not very hard   Food Insecurity: Not on file   Transportation Needs: No Transportation Needs (1/25/2024)    OASIS : Transportation     Lack of Transportation (Medical): No     Lack of Transportation (Non-Medical): No     Patient Unable or Declines to Respond: No   Physical Activity: Not on file   Stress: Not on file   Social Connections: Feeling Socially Integrated (1/25/2024)    OASIS : Social Isolation     Frequency of experiencing loneliness or isolation: Never   Intimate Partner Violence: Not on file   Housing Stability: Low Risk  (1/4/2024)    Housing Stability Vital Sign     Unable to Pay for Housing in the Last Year: No     Number of Places Lived in the Last Year: 1     Unstable Housing in the Last Year: No         Vitals:    07/09/25 1031   BP: 124/80   Pulse: 62   Resp: 17   Temp: 36.5 °C (97.7 °F)   SpO2: 97%     95.3 kg (210 lb)          Physical Exam  Vitals reviewed.   Constitutional:       Appearance: Normal appearance.   HENT:      Head: Normocephalic and atraumatic.   Cardiovascular:      Rate and Rhythm: Normal rate and regular rhythm.      Pulses: Normal pulses.      Heart sounds: Normal heart sounds, S1 normal and S2 normal.   Pulmonary:      Effort: Pulmonary effort is normal.      Breath sounds: Normal breath sounds.   Abdominal:      General: Abdomen is flat. Bowel sounds are normal.      Palpations: Abdomen is soft.   Genitourinary:     Penis: Normal and circumcised.       Testes: Cremasteric reflex is present.         Right: Tenderness and swelling present.      Epididymis:      Right: Tenderness present.      Left: Normal.   Musculoskeletal:         General: Normal range of motion.    Skin:     General: Skin is warm and dry.      Capillary Refill: Capillary refill takes less than 2 seconds.   Neurological:      Mental Status: He is alert. Mental status is at baseline.   Psychiatric:         Mood and Affect: Mood normal.         Behavior: Behavior normal.         LABORATORY @ RADIOLOGICAL IMAGING (if done):     Results for orders placed or performed in visit on 07/09/25 (from the past 24 hours)   POCT UA (nonautomated) manually resulted   Result Value Ref Range    POC Color, Urine Yellow Straw, Yellow, Light-Yellow    POC Appearance, Urine Clear Clear    POC Glucose, Urine NEGATIVE NEGATIVE mg/dl    POC Bilirubin, Urine NEGATIVE NEGATIVE    POC Ketones, Urine NEGATIVE NEGATIVE mg/dl    POC Specific Gravity, Urine 1.025 1.005 - 1.035    POC Blood, Urine NEGATIVE NEGATIVE    POC PH, Urine 5.5 No Reference Range Established PH    POC Protein, Urine NEGATIVE NEGATIVE mg/dl    POC Urobilinogen, Urine 0.2 0.2, 1.0 EU/DL    Poc Nitrite, Urine NEGATIVE NEGATIVE    POC Leukocytes, Urine NEGATIVE NEGATIVE       Study Result    Narrative & Impression   Interpreted By:  Mason Upton,   STUDY:  US SCROTUM WITH DOPPLER;  7/9/2025 11:28 am      INDICATION:  Signs/Symptoms:pain.      COMPARISON:  None.      ACCESSION NUMBER(S):  [id number]      ORDERING CLINICIAN:  ZACK MCALLISTER      TECHNIQUE:  Multiple ultrasonographic images of scrotum and tested were obtained.      FINDINGS:  RIGHT HEMISCROTUM:      RIGHT TESTICLE:  4.4 x 2.5 x 2.6 cm. No solid intratesticular mass. Arterial and  venous flow seen.      RIGHT EPIDIDYMIS HEAD:  0.7 x 1 x 0.5 cm. Varicocele. Epididymal appendage.      LEFT HEMISCROTUM:      LEFT TESTICLE:  4 x 2.4 x 2.8 cm. No solid intratesticular mass. Arterial and venous  flow seen.      LEFT EPIDIDYMIS HEAD:  0.6 x 0.8 x 1 cm.      IMPRESSION:  No evidence of testicular torsion or epididymo-orchitis.      Unilateral right varicocele. As a precaution recommend follow-up with  CT of the  abdomen and pelvis to exclude intra-abdominal mass.      Signed by: Mason Upton 7/9/2025 12:38 PM  Dictation workstation:   [id number]     ____________________________________________________________________    I did personally review Angel's past medical history, surgical history, social history, as well as family history (when relevant).  In this case, I also oversaw the his drug management by reviewing his medication list, allergy list, as well as the medications that I prescribed during the UC course and/or recommended as an out-patient (including possible OTC medications such as acetaminophen, NSAIDs , etc).    After reviewing the items above, I did look at previous medical documentation, such as recent hospitalizations, office visits, and/or recent consultations with PCP/specialist.                          SDOH:   Another factor that I considered in Angel's care was his Social Determinants of Health (SDOH). During this UC encounter, he did not have social determinants of health. Those SDOH influencing Angel's care are: none      UC COURSE/MEDICAL DECISION MAKING:    Angel is a 45 y.o., who presents with a working diagnosis of   1. Varicocele    2. Acute pain in scrotum    3. Pain in right testicle     with a differential to include: Cystitis, testicular torsion, overactive bladder, kidney stones, prostatitis, epididymitis, variocele, and urethritis    Plan:   POCT UA - unremarkable  Ultrasound scrotum - See above   CT abdomen/pelvis per recommendation At 1500  Naproxen 500mg tablets twice daily as needed for pain x30 days  Referral to urology   Return to urgent care, primary care provider, or emergency department with worsening symptoms.      I, Fredis Tomas, helped prepare the medical record for my supervising clinician, Katherine Wilson DNP.     Fredis Tomas RN, [facility]    Supervised by  Katherine Wilson DNP   Advanced Practice Provider  [facility] URGENT CARE    I was  present with the APRN student who participated in the documentation of this note. I have personally seen and re-examined the patient and performed the medical decision-making components (assessment and plan of care). I have reviewed the APRN student documentation and verified the findings in the note as written with additions or exceptions as stated in the body of this note.             [1] No Known Allergies  [2]   Past Medical History:  Diagnosis Date    Abdominal pain     Acute gastroenteritis 12/07/2022    Arthritis     Chronic pain disorder     Cough, unspecified 12/07/2022    Gastroenteritis     Joint pain     Oxlw-Hyqpn-Dqzpnjn disease (HHS-HCC)     Diagnosed at age 7    Varicose vein of leg     Viral infection 12/07/2022   [3]   Current Outpatient Medications   Medication Sig Dispense Refill    naproxen (Naprosyn) 500 mg tablet Take 1 tablet (500 mg) by mouth 2 times daily (morning and late afternoon) for 10 days. 20 tablet 0    pantoprazole (ProtoNix) 40 mg EC tablet Take 1 tablet (40 mg) by mouth once daily in the morning. Take before meals. Do not crush, chew, or split. 30 tablet 0     No current facility-administered medications for this visit.   [4]   Past Surgical History:  Procedure Laterality Date    OTHER SURGICAL HISTORY  02/23/2022    No history of surgery    WISDOM TOOTH EXTRACTION      age 18   [5] No family history on file.

## 2025-07-15 ENCOUNTER — OFFICE VISIT (OUTPATIENT)
Dept: UROLOGY | Facility: CLINIC | Age: 45
End: 2025-07-15
Payer: COMMERCIAL

## 2025-07-15 VITALS — BODY MASS INDEX: 31.15 KG/M2 | HEIGHT: 70 IN | HEART RATE: 77 BPM | WEIGHT: 217.6 LBS

## 2025-07-15 DIAGNOSIS — I86.1 VARICOCELE: ICD-10-CM

## 2025-07-15 DIAGNOSIS — I86.1 RIGHT VARICOCELE: Primary | ICD-10-CM

## 2025-07-15 PROCEDURE — 99203 OFFICE O/P NEW LOW 30 MIN: CPT | Performed by: NURSE PRACTITIONER

## 2025-07-15 PROCEDURE — 1036F TOBACCO NON-USER: CPT | Performed by: NURSE PRACTITIONER

## 2025-07-15 PROCEDURE — 3008F BODY MASS INDEX DOCD: CPT | Performed by: NURSE PRACTITIONER

## 2025-07-15 ASSESSMENT — PATIENT HEALTH QUESTIONNAIRE - PHQ9
1. LITTLE INTEREST OR PLEASURE IN DOING THINGS: NOT AT ALL
2. FEELING DOWN, DEPRESSED OR HOPELESS: NOT AT ALL
SUM OF ALL RESPONSES TO PHQ9 QUESTIONS 1 AND 2: 0

## 2025-07-15 ASSESSMENT — PAIN SCALES - GENERAL: PAINLEVEL_OUTOF10: 0-NO PAIN

## 2025-07-15 NOTE — PROGRESS NOTES
[facility]  Outpatient Clinic Note        Subjective   Patient ID: Angel Chadwick is a 45 y.o. male who presents for varicocele.      History of Present Illness  Angel is a 45 y.o. male who presents for left patient states the weekend of July 4 he was moving some objects, not heavy and felt a sharp pain in the right groin.  He has been treating with Aleve and ice as needed.  Wearing briefs rather than boxers for support.  Describes discomfort now as dull and achy and improved from initial injury.  Went to urgent care ultrasound of the scrotum showed no evidence of testicular torsion or epididymoorchitis.  Unilateral right varicocele.  CT of the abdomen pelvis was completed showing no retroperitoneal mass, borderline caliber of the right testicular vein at the IVC insertion.  Right inguinal scrotal varicocele.  Benign left renal nodules favoring simple cyst.  Also a 6 mm nodule in the right chest.  Patient was referred by urgent care NP to follow-up with PCP Dr. Albright.  Patient states he has follow-up scheduled for that.    Denies urgency, frequency, hematuria or dysuria.  No fever, chills, nausea or vomiting.  Nocturia 0.  Has not had any pain waking him from sleep.    Past Medical & Surgical History  Medical History[1]   Surgical History[2]     Review of Systems:    A 12 point review of systems was completed and otherwise negative unless noted in the HPI    Physical Exam                                                                                                                      General: Well developed, well nourished, alert and cooperative, appears in no acute distress  Eyes: Non-injected conjunctiva, sclera clear, no proptosis  Cardiac: Extremities are warm and well perfused. No edema, cyanosis or pallor.   Lungs: Breathing is easy, non-labored. Speaking in clear and complete sentences. Normal diaphragmatic movement.  Abdomen: soft NT with BSP, no CVA tenderness  Genitourinary:    Kidneys: non  palpable bilat   Bladder: non tender, non distended   Scrotum: no mass palpated, right varicocele, tender to palpation, no redness.   Epididymis: non tender; no mass palpated bilat   Testicles: no mass   Urethra: no discharge   Penis: WNL, no lesions, circumcised   Prostate: deferred        MSK: Ambulatory with steady gait, unassisted  Neuro: alert and oriented to person, place and time  Psych: Demonstrates good judgement and reason, without abnormal affect or abnormal behaviors.  Skin: no obvious lesions, no rashes    Objective     LABS  No results found for this visit on 07/15/25.     Imaging        Problem List Items Addressed This Visit       Right varicocele - Primary    Current Assessment & Plan   Review of scrotal ultrasound and CT abdomen pelvis completed.  Discussed with patient.  Questions answered.    Recommend conservative treatment as noted in HPI.  Patient is feeling better overall.    Will reach out to vascular to see if there is any additional workup that needs to be done given caliber of the right testicular vein at the IVC insertion.  Discussed with patient that I would reach back out to him with any information after follow-up with vascular.  He agrees with plan of care.    ADDENDUM: Spoke with Dr. Bedolla, vascular re: above. As pt is doing better, he does not advise any further testing.  Will FU based on s/s.           Other Visit Diagnoses         Varicocele                          BRUNO Piper-CNP 07/15/25 1:03 PM          [1]   Past Medical History:  Diagnosis Date    Abdominal pain     Acute gastroenteritis 12/07/2022    Arthritis     Chronic pain disorder     Cough, unspecified 12/07/2022    Gastroenteritis     Joint pain     Lqxy-Lexgq-Nmglmyv disease (HHS-HCC)     Diagnosed at age 7    Varicose vein of leg     Viral infection 12/07/2022   [2]   Past Surgical History:  Procedure Laterality Date    OTHER SURGICAL HISTORY  02/23/2022    No history of surgery    WISDOM TOOTH EXTRACTION       age 18

## 2025-07-15 NOTE — ASSESSMENT & PLAN NOTE
Review of scrotal ultrasound and CT abdomen pelvis completed.  Discussed with patient.  Questions answered.    Recommend conservative treatment as noted in HPI.  Patient is feeling better overall.    Will reach out to vascular to see if there is any additional workup that needs to be done given caliber of the right testicular vein at the IVC insertion.  Discussed with patient that I would reach back out to him with any information after follow-up with vascular.  He agrees with plan of care.    ADDENDUM: Spoke with Dr. Bedolla, vascular re: above. As pt is doing better, he does not advise any further testing.  Will FU based on s/s.

## 2025-10-03 ENCOUNTER — APPOINTMENT (OUTPATIENT)
Dept: PRIMARY CARE | Facility: CLINIC | Age: 45
End: 2025-10-03
Payer: COMMERCIAL

## (undated) DEVICE — STAPLER, SKIN PROXIMATE, 35 WIDE

## (undated) DEVICE — DRAPE, TIBURON, HIP W/POUCHES

## (undated) DEVICE — MAYO TRAY, LARGE

## (undated) DEVICE — SUTURE, VICRYL, 1, 27 IN, CTX, VIOLET

## (undated) DEVICE — BANDAGE, GAUZE, CONFORMING, KERLIX, 6 PLY, 4.5 IN X 4.1 YD

## (undated) DEVICE — COVER, TABLE, UHC

## (undated) DEVICE — SUTURE, ETHIBOND EXCEL 1, TAPER POINT CT-1 GREEN 30 INCH

## (undated) DEVICE — DRAIN, CHANNEL W/TROCAR 15F

## (undated) DEVICE — SYRINGE, 60 CC, LUER LOCK, MONOJECT, W/O CAP, LF

## (undated) DEVICE — SUTURE, VICRYL, 1, 27 IN, CT-1, VIOLET

## (undated) DEVICE — ELECTRODE, ELECTROSURGICAL, BLADE, STANDARD, 2.75 IN

## (undated) DEVICE — ELECTRODE, ELECTROSURGICAL, BLADE, NONSTICK, MODIFIED, 6.5 IN X 165 MM

## (undated) DEVICE — BANDAGE, COFLEX, 6 X 5 YDS, FOAM TAN, STERILE, LF

## (undated) DEVICE — DRESSING, NON-ADHERENT, OIL EMULSION, CURITY, 3 X 8 IN, STERILE

## (undated) DEVICE — EVACUATOR, WOUND, CLOSED, 3 SPRING, 400 CC, Y CONNECTING TUBE

## (undated) DEVICE — SUTURE, MONOCRYL, 4-0, 18 IN, PS2, UNDYED

## (undated) DEVICE — SUTURE, ETHIBOND, 5, 30 IN, V40, MULTIPACK, GREEN

## (undated) DEVICE — VEST, SURGEON, PRECEPT, LF

## (undated) DEVICE — TIP, SUCTION, FRAZIER, W/CONTROL VENT, 12 FR

## (undated) DEVICE — BOLSTER, HIP

## (undated) DEVICE — DRAPE, SHEET, U, W/ADHESIVE STRIP, IMPERVIOUS, 60 X 70 IN, DISPOSABLE, LF, STERILE

## (undated) DEVICE — APPLICATOR, CHLORAPREP, W/ORANGE TINT, 26ML

## (undated) DEVICE — ADHESIVE, SKIN, LIQUIBAND EXCEED

## (undated) DEVICE — STOCKINETTE, IMPERVIOUS, 12 X 48 IN, LF, STERILE

## (undated) DEVICE — MANIFOLD, 4 PORT NEPTUNE STANDARD

## (undated) DEVICE — COVER, CART, 45 X 27 X 48 IN, CLEAR

## (undated) DEVICE — NEEDLE, EPIDURAL, TUOHY, 18 G X 3.5 IN

## (undated) DEVICE — Device

## (undated) DEVICE — DRAPE, INCISE, ANTIMICROBIAL, IOBAN 2, STERI DRAPE, 23 X 33 IN, DISPOSABLE, STERILE

## (undated) DEVICE — HIGH FLOW TIP FOR INTERPULSE HANDPIECE SET

## (undated) DEVICE — SUTURE, VICRYL, 2-0, 27 IN, FSL, UNDYED